# Patient Record
Sex: FEMALE | Race: BLACK OR AFRICAN AMERICAN | NOT HISPANIC OR LATINO | Employment: UNEMPLOYED | URBAN - METROPOLITAN AREA
[De-identification: names, ages, dates, MRNs, and addresses within clinical notes are randomized per-mention and may not be internally consistent; named-entity substitution may affect disease eponyms.]

---

## 2020-01-13 ENCOUNTER — HOSPITAL ENCOUNTER (OUTPATIENT)
Dept: URGENT CARE | Facility: CLINIC | Age: 45
Discharge: HOME | End: 2020-01-13
Attending: FAMILY MEDICINE

## 2022-03-15 ENCOUNTER — APPOINTMENT (OUTPATIENT)
Dept: GENERAL RADIOLOGY | Facility: HOSPITAL | Age: 47
End: 2022-03-15

## 2022-03-15 VITALS
OXYGEN SATURATION: 99 % | DIASTOLIC BLOOD PRESSURE: 81 MMHG | HEART RATE: 84 BPM | RESPIRATION RATE: 16 BRPM | TEMPERATURE: 98.4 F | HEIGHT: 64 IN | WEIGHT: 184.97 LBS | BODY MASS INDEX: 31.58 KG/M2 | SYSTOLIC BLOOD PRESSURE: 111 MMHG

## 2022-03-15 LAB
ALBUMIN SERPL-MCNC: 4.6 G/DL (ref 3.5–5.2)
ALBUMIN/GLOB SERPL: 1.8 G/DL
ALP SERPL-CCNC: 58 U/L (ref 39–117)
ALT SERPL W P-5'-P-CCNC: 16 U/L (ref 1–33)
ANION GAP SERPL CALCULATED.3IONS-SCNC: 9.1 MMOL/L (ref 5–15)
AST SERPL-CCNC: 14 U/L (ref 1–32)
BASOPHILS # BLD AUTO: 0.04 10*3/MM3 (ref 0–0.2)
BASOPHILS NFR BLD AUTO: 0.6 % (ref 0–1.5)
BILIRUB SERPL-MCNC: 0.3 MG/DL (ref 0–1.2)
BUN SERPL-MCNC: 14 MG/DL (ref 6–20)
BUN/CREAT SERPL: 14.3 (ref 7–25)
CALCIUM SPEC-SCNC: 9.2 MG/DL (ref 8.6–10.5)
CHLORIDE SERPL-SCNC: 106 MMOL/L (ref 98–107)
CO2 SERPL-SCNC: 24.9 MMOL/L (ref 22–29)
CREAT SERPL-MCNC: 0.98 MG/DL (ref 0.57–1)
DEPRECATED RDW RBC AUTO: 40.4 FL (ref 37–54)
EGFRCR SERPLBLD CKD-EPI 2021: 73.1 ML/MIN/1.73
EOSINOPHIL # BLD AUTO: 0.1 10*3/MM3 (ref 0–0.4)
EOSINOPHIL NFR BLD AUTO: 1.5 % (ref 0.3–6.2)
ERYTHROCYTE [DISTWIDTH] IN BLOOD BY AUTOMATED COUNT: 12.5 % (ref 12.3–15.4)
GLOBULIN UR ELPH-MCNC: 2.5 GM/DL
GLUCOSE SERPL-MCNC: 97 MG/DL (ref 65–99)
HCT VFR BLD AUTO: 45.4 % (ref 34–46.6)
HGB BLD-MCNC: 15.8 G/DL (ref 12–15.9)
IMM GRANULOCYTES # BLD AUTO: 0.01 10*3/MM3 (ref 0–0.05)
IMM GRANULOCYTES NFR BLD AUTO: 0.1 % (ref 0–0.5)
LYMPHOCYTES # BLD AUTO: 2.83 10*3/MM3 (ref 0.7–3.1)
LYMPHOCYTES NFR BLD AUTO: 42.2 % (ref 19.6–45.3)
MCH RBC QN AUTO: 30.5 PG (ref 26.6–33)
MCHC RBC AUTO-ENTMCNC: 34.8 G/DL (ref 31.5–35.7)
MCV RBC AUTO: 87.6 FL (ref 79–97)
MONOCYTES # BLD AUTO: 0.54 10*3/MM3 (ref 0.1–0.9)
MONOCYTES NFR BLD AUTO: 8 % (ref 5–12)
NEUTROPHILS NFR BLD AUTO: 3.19 10*3/MM3 (ref 1.7–7)
NEUTROPHILS NFR BLD AUTO: 47.6 % (ref 42.7–76)
NRBC BLD AUTO-RTO: 0 /100 WBC (ref 0–0.2)
PLATELET # BLD AUTO: 249 10*3/MM3 (ref 140–450)
PMV BLD AUTO: 9.2 FL (ref 6–12)
POTASSIUM SERPL-SCNC: 4.6 MMOL/L (ref 3.5–5.2)
PROT SERPL-MCNC: 7.1 G/DL (ref 6–8.5)
RBC # BLD AUTO: 5.18 10*6/MM3 (ref 3.77–5.28)
SODIUM SERPL-SCNC: 140 MMOL/L (ref 136–145)
WBC NRBC COR # BLD: 6.71 10*3/MM3 (ref 3.4–10.8)

## 2022-03-15 PROCEDURE — 99283 EMERGENCY DEPT VISIT LOW MDM: CPT

## 2022-03-15 PROCEDURE — 71045 X-RAY EXAM CHEST 1 VIEW: CPT

## 2022-03-15 PROCEDURE — 80053 COMPREHEN METABOLIC PANEL: CPT | Performed by: EMERGENCY MEDICINE

## 2022-03-15 PROCEDURE — 85025 COMPLETE CBC W/AUTO DIFF WBC: CPT

## 2022-03-15 PROCEDURE — 36415 COLL VENOUS BLD VENIPUNCTURE: CPT | Performed by: EMERGENCY MEDICINE

## 2022-03-16 ENCOUNTER — HOSPITAL ENCOUNTER (EMERGENCY)
Facility: HOSPITAL | Age: 47
Discharge: HOME | End: 2022-03-16
Attending: EMERGENCY MEDICINE | Admitting: EMERGENCY MEDICINE

## 2022-03-16 DIAGNOSIS — J40 BRONCHITIS: Primary | ICD-10-CM

## 2022-03-16 LAB
HOLD SPECIMEN: NORMAL
HOLD SPECIMEN: NORMAL
WHOLE BLOOD HOLD SPECIMEN: NORMAL
WHOLE BLOOD HOLD SPECIMEN: NORMAL

## 2022-03-16 PROCEDURE — 63710000001 PREDNISONE PER 5 MG: Performed by: NURSE PRACTITIONER

## 2022-03-16 RX ORDER — ALBUTEROL SULFATE 90 UG/1
2 AEROSOL, METERED RESPIRATORY (INHALATION) EVERY 6 HOURS PRN
Qty: 18 G | Refills: 0 | Status: SHIPPED | OUTPATIENT
Start: 2022-03-16 | End: 2022-07-18

## 2022-03-16 RX ORDER — PREDNISONE 20 MG/1
60 TABLET ORAL DAILY
Qty: 15 TABLET | Refills: 0 | Status: SHIPPED | OUTPATIENT
Start: 2022-03-16 | End: 2022-03-21

## 2022-03-16 RX ORDER — BROMPHENIRAMINE MALEATE, PSEUDOEPHEDRINE HYDROCHLORIDE, AND DEXTROMETHORPHAN HYDROBROMIDE 2; 30; 10 MG/5ML; MG/5ML; MG/5ML
10 SYRUP ORAL 4 TIMES DAILY PRN
Qty: 473 ML | Refills: 0 | OUTPATIENT
Start: 2022-03-16 | End: 2022-07-18

## 2022-03-16 RX ADMIN — PREDNISONE 60 MG: 50 TABLET ORAL at 01:25

## 2022-03-16 NOTE — DISCHARGE INSTRUCTIONS
Take medication as prescribed    Follow-up with PCP    Return for new or worsening symptoms    Tylenol or Motrin as needed for pain

## 2022-03-16 NOTE — ED PROVIDER NOTES
Time: 01:10 EDT  Arrived by: Vehicle  Chief Complaint: Cough  History provided by: Patient  History is limited by: N/A    History of Present Illness:  Patient is a 46 y.o. year old female that presents to the emergency department with cough x 2 weeks      History provided by:  Patient  Cough  Cough characteristics:  Productive  Sputum characteristics:  Clear  Severity:  Moderate  Onset quality:  Gradual  Duration:  2 weeks  Timing:  Constant  Progression:  Waxing and waning  Chronicity:  New  Context: upper respiratory infection    Relieved by:  Nothing  Worsened by:  Deep breathing  Ineffective treatments:  None tried  Associated symptoms: rhinorrhea and sore throat ( from coughing so much)    Associated symptoms: no chest pain, no chills, no diaphoresis, no ear fullness, no ear pain, no eye discharge, no fever, no headaches, no myalgias, no shortness of breath, no sinus congestion, no weight loss and no wheezing        Similar Symptoms Previously: No    Recently seen: No      Patient Care Team  Primary Care Provider: Unknown    Past Medical History:     Allergies   Allergen Reactions   • Latex Swelling   • Sulfate Other (See Comments)     Keeps awake     No past medical history on file.  No past surgical history on file.  No family history on file.    Home Medications:  Prior to Admission medications    Not on File        Social History:   PT  has no history on file for tobacco use, alcohol use, and drug use.    Record Review:  I have reviewed the patient's records in [employer].     Review of Systems  Review of Systems   Constitutional: Negative for activity change, appetite change, chills, diaphoresis, fever and weight loss.   HENT: Positive for rhinorrhea and sore throat ( from coughing so much). Negative for congestion and ear pain.    Eyes: Negative.  Negative for discharge.   Respiratory: Positive for cough. Negative for shortness of breath and wheezing.         Pain when takes a deep breath   Cardiovascular:  "Negative for chest pain.   Gastrointestinal: Negative for abdominal pain, diarrhea, nausea and vomiting.   Endocrine: Negative.    Genitourinary: Negative for flank pain.   Musculoskeletal: Negative for back pain and myalgias.   Skin: Negative.    Allergic/Immunologic: Negative.    Neurological: Negative.  Negative for headaches.   Hematological: Negative.    Psychiatric/Behavioral: Negative.         Physical Exam  /81 (Patient Position: Sitting)   Pulse 84   Temp 98.4 °F (36.9 °C) (Oral)   Resp 16   Ht 161.3 cm (63.5\")   Wt 83.9 kg (184 lb 15.5 oz)   SpO2 99%   BMI 32.25 kg/m²     Physical Exam  Vitals and nursing note reviewed.   Constitutional:       General: She is not in acute distress.     Appearance: Normal appearance. She is not toxic-appearing.   HENT:      Head: Normocephalic and atraumatic.      Right Ear: Tympanic membrane, ear canal and external ear normal.      Left Ear: Tympanic membrane, ear canal and external ear normal.      Nose: Nose normal.      Mouth/Throat:      Mouth: Mucous membranes are moist.      Pharynx: No oropharyngeal exudate or posterior oropharyngeal erythema.   Eyes:      General: No scleral icterus.     Conjunctiva/sclera: Conjunctivae normal.      Pupils: Pupils are equal, round, and reactive to light.   Cardiovascular:      Rate and Rhythm: Normal rate and regular rhythm.      Pulses: Normal pulses.      Heart sounds: Normal heart sounds.   Pulmonary:      Effort: Pulmonary effort is normal. No respiratory distress.      Breath sounds: Normal breath sounds.   Abdominal:      Tenderness: There is no abdominal tenderness.   Musculoskeletal:         General: Normal range of motion.      Cervical back: Normal range of motion and neck supple.   Lymphadenopathy:      Cervical: No cervical adenopathy.   Skin:     General: Skin is warm and dry.   Neurological:      Mental Status: She is alert and oriented to person, place, and time.   Psychiatric:         Mood and Affect: " "Mood normal.         Behavior: Behavior normal.                  ED Course  /81 (Patient Position: Sitting)   Pulse 84   Temp 98.4 °F (36.9 °C) (Oral)   Resp 16   Ht 161.3 cm (63.5\")   Wt 83.9 kg (184 lb 15.5 oz)   SpO2 99%   BMI 32.25 kg/m²   Results for orders placed or performed during the hospital encounter of 03/16/22   Comprehensive Metabolic Panel    Specimen: Blood   Result Value Ref Range    Glucose 97 65 - 99 mg/dL    BUN 14 6 - 20 mg/dL    Creatinine 0.98 0.57 - 1.00 mg/dL    Sodium 140 136 - 145 mmol/L    Potassium 4.6 3.5 - 5.2 mmol/L    Chloride 106 98 - 107 mmol/L    CO2 24.9 22.0 - 29.0 mmol/L    Calcium 9.2 8.6 - 10.5 mg/dL    Total Protein 7.1 6.0 - 8.5 g/dL    Albumin 4.60 3.50 - 5.20 g/dL    ALT (SGPT) 16 1 - 33 U/L    AST (SGOT) 14 1 - 32 U/L    Alkaline Phosphatase 58 39 - 117 U/L    Total Bilirubin 0.3 0.0 - 1.2 mg/dL    Globulin 2.5 gm/dL    A/G Ratio 1.8 g/dL    BUN/Creatinine Ratio 14.3 7.0 - 25.0    Anion Gap 9.1 5.0 - 15.0 mmol/L    eGFR 73.1 >60.0 mL/min/1.73   CBC Auto Differential    Specimen: Blood   Result Value Ref Range    WBC 6.71 3.40 - 10.80 10*3/mm3    RBC 5.18 3.77 - 5.28 10*6/mm3    Hemoglobin 15.8 12.0 - 15.9 g/dL    Hematocrit 45.4 34.0 - 46.6 %    MCV 87.6 79.0 - 97.0 fL    MCH 30.5 26.6 - 33.0 pg    MCHC 34.8 31.5 - 35.7 g/dL    RDW 12.5 12.3 - 15.4 %    RDW-SD 40.4 37.0 - 54.0 fl    MPV 9.2 6.0 - 12.0 fL    Platelets 249 140 - 450 10*3/mm3    Neutrophil % 47.6 42.7 - 76.0 %    Lymphocyte % 42.2 19.6 - 45.3 %    Monocyte % 8.0 5.0 - 12.0 %    Eosinophil % 1.5 0.3 - 6.2 %    Basophil % 0.6 0.0 - 1.5 %    Immature Grans % 0.1 0.0 - 0.5 %    Neutrophils, Absolute 3.19 1.70 - 7.00 10*3/mm3    Lymphocytes, Absolute 2.83 0.70 - 3.10 10*3/mm3    Monocytes, Absolute 0.54 0.10 - 0.90 10*3/mm3    Eosinophils, Absolute 0.10 0.00 - 0.40 10*3/mm3    Basophils, Absolute 0.04 0.00 - 0.20 10*3/mm3    Immature Grans, Absolute 0.01 0.00 - 0.05 10*3/mm3    nRBC 0.0 0.0 - 0.2 " /100 WBC   Green Top (Gel)   Result Value Ref Range    Extra Tube Hold Specimen    Lavender Top   Result Value Ref Range    Extra Tube Hold Specimen    Gold Top - SST   Result Value Ref Range    Extra Tube Hold Specimen    Light Blue Top   Result Value Ref Range    Extra Tube Hold Specimen      Medications   predniSONE (DELTASONE) tablet 60 mg (has no administration in time range)     XR Chest 1 View    Result Date: 3/16/2022  Narrative: PROCEDURE: XR CHEST 1 VW  COMPARISON: None.  INDICATIONS: COUGH X 2 DAYS.  FINDINGS: A single frontal (PA upright portable) chest radiograph reveals no cardiac enlargement and no acute infiltrate. No pneumothorax is seen.      Impression:  No acute cardiopulmonary disease is seen radiographically.      ALAN LIMON JR, MD       Electronically Signed and Approved By: ALAN LIMON JR, MD on 3/16/2022 at 0:49             Medical Decision Making:                     MDM  Number of Diagnoses or Management Options  Bronchitis  Diagnosis management comments: The patient presents to the ED with a cough. The patient is resting comfortably and feels better, is alert and in no distress.  On re-examination the patient does not appear toxic and has no meningeal signs (including a negative Kernig and Brudzinski sign), and there's no intractable vomiting, respiratory distress and no apparent pain. Based on the history, exam, diagnostic testing and reassessment, the patient has no signs of meningitis, significant pneumonia, pyelonephritis, sepsis or other acute serious bacterial infections, or other significant pathology to warrant further testing, continued ED treatment, admission or specialist evaluation. The patient's vital signs have been stable. The patient's condition is stable and is appropriate for discharge. The patient´s symptoms are consistent with a viral upper respiratory infection and antibiotics are not indicated. The patient was counseled to return to the ED for re-evaluation  for worsening cough, shortness of breath, uncontrollable headache, uncontrollable fever, altered mental status, or any symptoms which cause them concern. The patient will pursue further outpatient evaluation with the primary care physician or other designated or consultant physician as indicated in the discharge instructions.         Amount and/or Complexity of Data Reviewed  Clinical lab tests: reviewed and ordered  Tests in the radiology section of CPT®: reviewed and ordered  Tests in the medicine section of CPT®: ordered and reviewed    Risk of Complications, Morbidity, and/or Mortality  Presenting problems: low  Diagnostic procedures: low  Management options: low    Patient Progress  Patient progress: stable       Final diagnoses:   Bronchitis        Disposition:  ED Disposition     ED Disposition   Discharge    Condition   Stable    Comment   --              Daija Soto, APRN  03/16/22 0110

## 2022-03-23 ENCOUNTER — HOSPITAL ENCOUNTER (EMERGENCY)
Facility: HOSPITAL | Age: 47
Discharge: HOME | End: 2022-03-23
Attending: EMERGENCY MEDICINE | Admitting: EMERGENCY MEDICINE

## 2022-03-23 VITALS
HEART RATE: 74 BPM | OXYGEN SATURATION: 99 % | SYSTOLIC BLOOD PRESSURE: 100 MMHG | RESPIRATION RATE: 18 BRPM | DIASTOLIC BLOOD PRESSURE: 70 MMHG | TEMPERATURE: 97.1 F

## 2022-03-23 DIAGNOSIS — M79.18 MUSCULOSKELETAL PAIN: Primary | ICD-10-CM

## 2022-03-23 PROCEDURE — 99283 EMERGENCY DEPT VISIT LOW MDM: CPT

## 2022-03-23 RX ORDER — BACLOFEN 10 MG/1
10 TABLET ORAL ONCE
Status: COMPLETED | OUTPATIENT
Start: 2022-03-23 | End: 2022-03-23

## 2022-03-23 RX ORDER — SODIUM CHLORIDE 0.9 % (FLUSH) 0.9 %
10 SYRINGE (ML) INJECTION AS NEEDED
Status: DISCONTINUED | OUTPATIENT
Start: 2022-03-23 | End: 2022-03-23

## 2022-03-23 RX ORDER — METAXALONE 800 MG/1
800 TABLET ORAL 3 TIMES DAILY PRN
Qty: 20 TABLET | Refills: 0 | OUTPATIENT
Start: 2022-03-23 | End: 2022-07-18

## 2022-03-23 RX ORDER — IBUPROFEN 800 MG/1
800 TABLET ORAL ONCE
Status: COMPLETED | OUTPATIENT
Start: 2022-03-23 | End: 2022-03-23

## 2022-03-23 RX ORDER — NAPROXEN 500 MG/1
500 TABLET ORAL 2 TIMES DAILY WITH MEALS
Qty: 14 TABLET | Refills: 0 | OUTPATIENT
Start: 2022-03-23 | End: 2022-07-18

## 2022-03-23 RX ORDER — ONDANSETRON 2 MG/ML
4 INJECTION INTRAMUSCULAR; INTRAVENOUS ONCE
Status: DISCONTINUED | OUTPATIENT
Start: 2022-03-23 | End: 2022-03-23

## 2022-03-23 RX ADMIN — IBUPROFEN 800 MG: 800 TABLET, FILM COATED ORAL at 18:29

## 2022-03-23 RX ADMIN — BACLOFEN 10 MG: 10 TABLET ORAL at 18:29

## 2022-03-23 NOTE — ED NOTES
"Pt arrives via PV with c/o generalized abd pain \"on and off for a while\". Pt states \"I really just need pain medication\". Pt a&ox4, abc's intact, NAD noted, ambulatory to triage.    Patient wearing mask during triage. RN wearing appropriate PPE during triage. Hand hygiene performed.    "

## 2022-03-23 NOTE — ED PROVIDER NOTES
EMERGENCY DEPARTMENT ENCOUNTER    Room Number:  19/19  Date of encounter:  3/23/2022  PCP: Provider, No Known  Historian: Patient      HPI:  Chief Complaint: Pain      Context: Arlet Montoya is a 46 y.o. female who presents to the ED c/o low back pain and abdominal wall pain after vacuuming and lifting her baby.  The patient has a history of abdominal wall diastases and is scheduled for surgery in [place] next month.  She states she comes emergency room for pain medication.  She denies nausea, vomiting, diarrhea, fevers, chills.      PAST MEDICAL HISTORY  Active Ambulatory Problems     Diagnosis Date Noted   • No Active Ambulatory Problems     Resolved Ambulatory Problems     Diagnosis Date Noted   • No Resolved Ambulatory Problems     No Additional Past Medical History         PAST SURGICAL HISTORY  No past surgical history on file.      FAMILY HISTORY  No family history on file.      SOCIAL HISTORY  Social History     Socioeconomic History   • Marital status:          ALLERGIES  Latex and Sulfate        REVIEW OF SYSTEMS  Review of Systems     The patient denies headache, neck pain, lower extreme pain, lower extreme swelling or focal neuro deficit  All systems reviewed and negative except for those discussed in HPI.     PHYSICAL EXAM    I have reviewed the triage vital signs and nursing notes.    ED Triage Vitals [03/23/22 1611]   Temp Heart Rate Resp BP SpO2   97.1 °F (36.2 °C) 105 18 (!) 130/112 99 %      Temp src Heart Rate Source Patient Position BP Location FiO2 (%)   Tympanic Monitor Standing Left arm --       GENERAL: 46-year-old well developed, well nourished in no acute distress  HENT: NCAT, neck supple, trachea midline  EYES: no scleral icterus, PERRL, normal conjunctivae  CV: regular rhythm, regular rate, no murmur  RESPIRATORY: unlabored effort, CTAB  ABDOMEN: soft, nontender, nondistended, bowel sounds present  MUSCULOSKELETAL: no gross deformity, no pedal edema, no calf  tenderness  NEURO: alert,  sensory and motor function of extremities intact, speech clear, mental status normal  SKIN: warm, dry, no rash  PSYCH:  Appropriate mood and affect      PPE  Pt does not present with symptoms for COVID19; however, I was wearing a mask and goggles throughout all patient interaction.    Vital signs and nursing notes reviewed.      LAB RESULTS  No results found for this or any previous visit (from the past 24 hour(s)).    Ordered the above labs and independently reviewed the results.        RADIOLOGY  No Radiology Exams Resulted Within Past 24 Hours    I ordered the above noted radiological studies. Independently reviewed by me and discussed with radiologist.  See dictation above for official radiology interpretation.      PROCEDURES    Procedures        MEDICATIONS GIVEN IN ER    Medications   ibuprofen (ADVIL,MOTRIN) tablet 800 mg (800 mg Oral Given 3/23/22 1829)   baclofen (LIORESAL) tablet 10 mg (10 mg Oral Given 3/23/22 1829)         PROGRESS, DATA ANALYSIS, CONSULTS, AND MEDICAL DECISION MAKING    All labs have been independently reviewed by me.  All radiology studies have been reviewed by me and discussed with radiologist dictating report.   EKG's independently reviewed by me.  Discussion below represents my analysis of pertinent findings related to patient's condition, differential diagnosis, treatment plan and final disposition.      ED Course as of 03/23/22 1921   Wed Mar 23, 2022   1921 I have recheck the patient's blood work, urinalysis and imaging studies for evaluation of her pain.  The patient states she does not want any this performed and would just like an anti-inflammatory and muscle relaxer.  At this time the patient's vital signs are stable and she appears in no acute distress with a normal physical exam and I believe this to be reasonable. [GP]      ED Course User Index  [GP] Mal Shearer MD               AS OF 19:21 EDT VITALS:    BP - 100/70  HR - 74  TEMP - 97.1  °F (36.2 °C) (Tympanic)  02 SATS - 99%        DIAGNOSIS  Final diagnoses:   Musculoskeletal pain         DISPOSITION  Discharged        EMR Dragon/Transcription disclaimer:   Much of this encounter note is an electronic transcription/translation of spoken language to printed text.        Mal Shearer MD  03/23/22 1922

## 2022-07-18 ENCOUNTER — HOSPITAL ENCOUNTER (EMERGENCY)
Facility: HOSPITAL | Age: 47
Discharge: HOME | End: 2022-07-18
Attending: EMERGENCY MEDICINE | Admitting: EMERGENCY MEDICINE

## 2022-07-18 VITALS
SYSTOLIC BLOOD PRESSURE: 105 MMHG | RESPIRATION RATE: 17 BRPM | HEART RATE: 81 BPM | TEMPERATURE: 99 F | BODY MASS INDEX: 30.75 KG/M2 | OXYGEN SATURATION: 97 % | DIASTOLIC BLOOD PRESSURE: 68 MMHG | HEIGHT: 64 IN | WEIGHT: 180.12 LBS

## 2022-07-18 DIAGNOSIS — N30.01 ACUTE CYSTITIS WITH HEMATURIA: Primary | ICD-10-CM

## 2022-07-18 LAB
ALBUMIN SERPL-MCNC: 4.1 G/DL (ref 3.5–5.2)
ALBUMIN/GLOB SERPL: 1.5 G/DL
ALP SERPL-CCNC: 51 U/L (ref 39–117)
ALT SERPL W P-5'-P-CCNC: 10 U/L (ref 1–33)
ANION GAP SERPL CALCULATED.3IONS-SCNC: 12 MMOL/L (ref 5–15)
AST SERPL-CCNC: 12 U/L (ref 1–32)
BACTERIA UR QL AUTO: ABNORMAL /HPF
BASOPHILS # BLD AUTO: 0.03 10*3/MM3 (ref 0–0.2)
BASOPHILS NFR BLD AUTO: 0.4 % (ref 0–1.5)
BILIRUB SERPL-MCNC: 0.4 MG/DL (ref 0–1.2)
BILIRUB UR QL STRIP: NEGATIVE
BUN SERPL-MCNC: 13 MG/DL (ref 6–20)
BUN/CREAT SERPL: 13 (ref 7–25)
CALCIUM SPEC-SCNC: 9.3 MG/DL (ref 8.6–10.5)
CHLORIDE SERPL-SCNC: 104 MMOL/L (ref 98–107)
CLARITY UR: ABNORMAL
CO2 SERPL-SCNC: 21 MMOL/L (ref 22–29)
COLOR UR: YELLOW
CREAT SERPL-MCNC: 1 MG/DL (ref 0.57–1)
DEPRECATED RDW RBC AUTO: 39.5 FL (ref 37–54)
EGFRCR SERPLBLD CKD-EPI 2021: 71.4 ML/MIN/1.73
EOSINOPHIL # BLD AUTO: 0.05 10*3/MM3 (ref 0–0.4)
EOSINOPHIL NFR BLD AUTO: 0.6 % (ref 0.3–6.2)
ERYTHROCYTE [DISTWIDTH] IN BLOOD BY AUTOMATED COUNT: 12.3 % (ref 12.3–15.4)
GLOBULIN UR ELPH-MCNC: 2.8 GM/DL
GLUCOSE SERPL-MCNC: 118 MG/DL (ref 65–99)
GLUCOSE UR STRIP-MCNC: NEGATIVE MG/DL
HCG INTACT+B SERPL-ACNC: <0.5 MIU/ML
HCT VFR BLD AUTO: 41.8 % (ref 34–46.6)
HGB BLD-MCNC: 14.5 G/DL (ref 12–15.9)
HGB UR QL STRIP.AUTO: ABNORMAL
HOLD SPECIMEN: 11
HOLD SPECIMEN: 11
HYALINE CASTS UR QL AUTO: ABNORMAL /LPF
IMM GRANULOCYTES # BLD AUTO: 0.02 10*3/MM3 (ref 0–0.05)
IMM GRANULOCYTES NFR BLD AUTO: 0.2 % (ref 0–0.5)
KETONES UR QL STRIP: NEGATIVE
LEUKOCYTE ESTERASE UR QL STRIP.AUTO: ABNORMAL
LIPASE SERPL-CCNC: 36 U/L (ref 13–60)
LYMPHOCYTES # BLD AUTO: 1.98 10*3/MM3 (ref 0.7–3.1)
LYMPHOCYTES NFR BLD AUTO: 23.8 % (ref 19.6–45.3)
MCH RBC QN AUTO: 30.2 PG (ref 26.6–33)
MCHC RBC AUTO-ENTMCNC: 34.7 G/DL (ref 31.5–35.7)
MCV RBC AUTO: 87.1 FL (ref 79–97)
MONOCYTES # BLD AUTO: 0.89 10*3/MM3 (ref 0.1–0.9)
MONOCYTES NFR BLD AUTO: 10.7 % (ref 5–12)
NEUTROPHILS NFR BLD AUTO: 5.34 10*3/MM3 (ref 1.7–7)
NEUTROPHILS NFR BLD AUTO: 64.3 % (ref 42.7–76)
NITRITE UR QL STRIP: NEGATIVE
NRBC BLD AUTO-RTO: 0 /100 WBC (ref 0–0.2)
PH UR STRIP.AUTO: 6.5 [PH] (ref 5–8)
PLATELET # BLD AUTO: 215 10*3/MM3 (ref 140–450)
PMV BLD AUTO: 9.5 FL (ref 6–12)
POTASSIUM SERPL-SCNC: 3.8 MMOL/L (ref 3.5–5.2)
PROT SERPL-MCNC: 6.9 G/DL (ref 6–8.5)
PROT UR QL STRIP: ABNORMAL
RBC # BLD AUTO: 4.8 10*6/MM3 (ref 3.77–5.28)
RBC # UR STRIP: ABNORMAL /HPF
REF LAB TEST METHOD: ABNORMAL
SODIUM SERPL-SCNC: 137 MMOL/L (ref 136–145)
SP GR UR STRIP: 1.01 (ref 1–1.03)
SQUAMOUS #/AREA URNS HPF: ABNORMAL /HPF
UROBILINOGEN UR QL STRIP: ABNORMAL
WBC # UR STRIP: ABNORMAL /HPF
WBC CLUMPS # UR AUTO: ABNORMAL /HPF
WBC NRBC COR # BLD: 8.31 10*3/MM3 (ref 3.4–10.8)
WHOLE BLOOD HOLD COAG: 11
WHOLE BLOOD HOLD SPECIMEN: 11

## 2022-07-18 PROCEDURE — 83690 ASSAY OF LIPASE: CPT | Performed by: EMERGENCY MEDICINE

## 2022-07-18 PROCEDURE — 99283 EMERGENCY DEPT VISIT LOW MDM: CPT

## 2022-07-18 PROCEDURE — 80053 COMPREHEN METABOLIC PANEL: CPT | Performed by: EMERGENCY MEDICINE

## 2022-07-18 PROCEDURE — 81001 URINALYSIS AUTO W/SCOPE: CPT | Performed by: EMERGENCY MEDICINE

## 2022-07-18 PROCEDURE — 36415 COLL VENOUS BLD VENIPUNCTURE: CPT

## 2022-07-18 PROCEDURE — 84702 CHORIONIC GONADOTROPIN TEST: CPT | Performed by: EMERGENCY MEDICINE

## 2022-07-18 PROCEDURE — 85025 COMPLETE CBC W/AUTO DIFF WBC: CPT | Performed by: EMERGENCY MEDICINE

## 2022-07-18 RX ORDER — PHENAZOPYRIDINE HYDROCHLORIDE 100 MG/1
200 TABLET, FILM COATED ORAL ONCE
Status: COMPLETED | OUTPATIENT
Start: 2022-07-18 | End: 2022-07-18

## 2022-07-18 RX ORDER — NITROFURANTOIN 25; 75 MG/1; MG/1
100 CAPSULE ORAL 2 TIMES DAILY
Qty: 14 CAPSULE | Refills: 0 | Status: SHIPPED | OUTPATIENT
Start: 2022-07-18 | End: 2022-07-18 | Stop reason: SDUPTHER

## 2022-07-18 RX ORDER — SODIUM CHLORIDE 0.9 % (FLUSH) 0.9 %
10 SYRINGE (ML) INJECTION AS NEEDED
Status: DISCONTINUED | OUTPATIENT
Start: 2022-07-18 | End: 2022-07-18 | Stop reason: HOSPADM

## 2022-07-18 RX ORDER — NITROFURANTOIN 25; 75 MG/1; MG/1
100 CAPSULE ORAL ONCE
Status: COMPLETED | OUTPATIENT
Start: 2022-07-18 | End: 2022-07-18

## 2022-07-18 RX ORDER — PHENAZOPYRIDINE HYDROCHLORIDE 200 MG/1
200 TABLET, FILM COATED ORAL 3 TIMES DAILY
Qty: 6 TABLET | Refills: 0 | Status: SHIPPED | OUTPATIENT
Start: 2022-07-18 | End: 2022-07-20

## 2022-07-18 RX ORDER — PHENAZOPYRIDINE HYDROCHLORIDE 200 MG/1
200 TABLET, FILM COATED ORAL 3 TIMES DAILY
Qty: 6 TABLET | Refills: 0 | Status: SHIPPED | OUTPATIENT
Start: 2022-07-18 | End: 2022-07-18 | Stop reason: SDUPTHER

## 2022-07-18 RX ORDER — FLUCONAZOLE 150 MG/1
150 TABLET ORAL ONCE
Status: COMPLETED | OUTPATIENT
Start: 2022-07-18 | End: 2022-07-18

## 2022-07-18 RX ORDER — NITROFURANTOIN 25; 75 MG/1; MG/1
100 CAPSULE ORAL 2 TIMES DAILY
Qty: 14 CAPSULE | Refills: 0 | Status: SHIPPED | OUTPATIENT
Start: 2022-07-18 | End: 2022-07-25

## 2022-07-18 RX ADMIN — FLUCONAZOLE 150 MG: 150 TABLET ORAL at 04:11

## 2022-07-18 RX ADMIN — PHENAZOPYRIDINE HYDROCHLORIDE 200 MG: 100 TABLET ORAL at 04:11

## 2022-07-18 RX ADMIN — NITROFURANTOIN MONOHYDRATE/MACROCRYSTALLINE 100 MG: 25; 75 CAPSULE ORAL at 04:11

## 2022-07-18 NOTE — DISCHARGE INSTRUCTIONS
Drink plenty fluids.    Take medication as prescribed.    Tylenol Motrin as needed for pain    Follow up with pcp 1 week if needed    Return for new/worse symptoms

## 2022-07-18 NOTE — ED PROVIDER NOTES
Time: 04:38 EDT  Arrived by: Private vehicle  Chief Complaint: Abdominal pain and strong urine  History provided by: Patient  History is limited by: N/A    History of Present Illness:  Patient is a 46 y.o. year old female that presents to the emergency department with abdominal pain and strong urine      Abdominal Pain  Pain location:  Suprapubic  Pain quality: burning and pressure    Pain radiates to:  Does not radiate  Pain severity:  Moderate  Onset quality:  Gradual  Duration:  3 days  Timing:  Constant  Progression:  Unchanged  Chronicity:  New  Context comment:  Patient swam in [facility] on Friday and thinks she ended up with a UTI because she is having urinary symptoms and pressure  Relieved by:  Nothing  Worsened by:  Urination  Ineffective treatments:  NSAIDs (aleve helped)  Associated symptoms: dysuria    Associated symptoms: no anorexia, no belching, no chest pain, no chills, no constipation, no cough, no diarrhea, no fatigue, no fever, no flatus, no hematemesis, no hematochezia, no hematuria, no melena, no nausea, no shortness of breath, no sore throat, no vaginal bleeding, no vaginal discharge and no vomiting    Difficulty Urinating  Associated symptoms: abdominal pain    Associated symptoms: no fever, no flank pain, no nausea, no vaginal discharge and no vomiting            Similar Symptoms Previously: Occasional UTI.  None recent  Recently seen: No      Patient Care Team  Primary Care Provider: None    Past Medical History:     Allergies   Allergen Reactions   • Latex Swelling   • Sulfate Other (See Comments)     Keeps awake     History reviewed. No pertinent past medical history.  History reviewed. No pertinent surgical history.  History reviewed. No pertinent family history.    Home Medications:  Prior to Admission medications    Medication Sig Start Date End Date Taking? Authorizing Provider   albuterol sulfate  (90 Base) MCG/ACT inhaler Inhale 2 puffs Every 6 (Six) Hours As Needed  "(Bronchospasm). 3/16/22   Daija Soto APRN   brompheniramine-pseudoephedrine-DM 30-2-10 MG/5ML syrup Take 10 mL by mouth 4 (Four) Times a Day As Needed for Cough. 3/16/22   Daija Soto APRN   metaxalone (SKELAXIN) 800 MG tablet Take 1 tablet by mouth 3 (Three) Times a Day As Needed for Muscle Spasms. 3/23/22   Mal Shearer MD   naproxen (EC NAPROSYN) 500 MG EC tablet Take 1 tablet by mouth 2 (Two) Times a Day With Meals. 3/23/22   Mal Shearer MD        Social History:   PT  reports that she has never smoked. She does not have any smokeless tobacco history on file. She reports current alcohol use. She reports that she does not use drugs.    Record Review:  I have reviewed the patient's records in [employer].     Review of Systems  Review of Systems   Constitutional: Negative for chills, fatigue and fever.   HENT: Negative for sore throat.    Respiratory: Negative for cough and shortness of breath.    Cardiovascular: Negative for chest pain.   Gastrointestinal: Positive for abdominal pain. Negative for anorexia, constipation, diarrhea, flatus, hematemesis, hematochezia, melena, nausea and vomiting.   Genitourinary: Positive for difficulty urinating, dysuria, frequency and urgency. Negative for flank pain, hematuria, vaginal bleeding and vaginal discharge.   Musculoskeletal: Positive for back pain ( low back achy).   Skin: Negative.    Neurological: Negative.    Hematological: Negative.    Psychiatric/Behavioral: Negative.         Physical Exam  /68   Pulse 81   Temp 99 °F (37.2 °C) (Oral)   Resp 17   Ht 162.6 cm (64\")   Wt 81.7 kg (180 lb 1.9 oz)   SpO2 97%   BMI 30.92 kg/m²     Physical Exam  Vitals and nursing note reviewed.   Constitutional:       General: She is not in acute distress.     Appearance: Normal appearance. She is not toxic-appearing.   HENT:      Head: Normocephalic and atraumatic.      Mouth/Throat:      Mouth: Mucous membranes are moist.   Eyes:      General: No scleral " "icterus.  Cardiovascular:      Rate and Rhythm: Normal rate and regular rhythm.      Pulses: Normal pulses.      Heart sounds: Normal heart sounds.   Pulmonary:      Effort: Pulmonary effort is normal. No respiratory distress.      Breath sounds: Normal breath sounds.   Abdominal:      General: Bowel sounds are normal.      Palpations: Abdomen is soft.      Tenderness: There is no abdominal tenderness. There is no right CVA tenderness or left CVA tenderness.      Hernia: No hernia is present.      Comments: No reproducible abdominal pain with palpation   Genitourinary:     Comments: Patient defers she states she does not have any discharge or bleeding  Musculoskeletal:         General: Normal range of motion.      Cervical back: Normal range of motion and neck supple.   Skin:     General: Skin is warm and dry.   Neurological:      Mental Status: She is alert and oriented to person, place, and time. Mental status is at baseline.   Psychiatric:         Mood and Affect: Mood normal.         Behavior: Behavior normal.          ED Course  /68   Pulse 81   Temp 99 °F (37.2 °C) (Oral)   Resp 17   Ht 162.6 cm (64\")   Wt 81.7 kg (180 lb 1.9 oz)   SpO2 97%   BMI 30.92 kg/m²   Results for orders placed or performed during the hospital encounter of 07/18/22   Comprehensive Metabolic Panel    Specimen: Blood   Result Value Ref Range    Glucose 118 (H) 65 - 99 mg/dL    BUN 13 6 - 20 mg/dL    Creatinine 1.00 0.57 - 1.00 mg/dL    Sodium 137 136 - 145 mmol/L    Potassium 3.8 3.5 - 5.2 mmol/L    Chloride 104 98 - 107 mmol/L    CO2 21.0 (L) 22.0 - 29.0 mmol/L    Calcium 9.3 8.6 - 10.5 mg/dL    Total Protein 6.9 6.0 - 8.5 g/dL    Albumin 4.10 3.50 - 5.20 g/dL    ALT (SGPT) 10 1 - 33 U/L    AST (SGOT) 12 1 - 32 U/L    Alkaline Phosphatase 51 39 - 117 U/L    Total Bilirubin 0.4 0.0 - 1.2 mg/dL    Globulin 2.8 gm/dL    A/G Ratio 1.5 g/dL    BUN/Creatinine Ratio 13.0 7.0 - 25.0    Anion Gap 12.0 5.0 - 15.0 mmol/L    eGFR 71.4 " >60.0 mL/min/1.73   Lipase    Specimen: Blood   Result Value Ref Range    Lipase 36 13 - 60 U/L   Urinalysis With Microscopic If Indicated (No Culture) - Urine, Clean Catch    Specimen: Urine, Clean Catch   Result Value Ref Range    Color, UA Yellow Yellow, Straw    Appearance, UA Turbid (A) Clear    pH, UA 6.5 5.0 - 8.0    Specific Gravity, UA 1.007 1.005 - 1.030    Glucose, UA Negative Negative    Ketones, UA Negative Negative    Bilirubin, UA Negative Negative    Blood, UA Moderate (2+) (A) Negative    Protein,  mg/dL (2+) (A) Negative    Leuk Esterase, UA Large (3+) (A) Negative    Nitrite, UA Negative Negative    Urobilinogen, UA 0.2 E.U./dL 0.2 - 1.0 E.U./dL   hCG, Quantitative, Pregnancy    Specimen: Blood   Result Value Ref Range    HCG Quantitative <0.50 mIU/mL   CBC Auto Differential    Specimen: Blood   Result Value Ref Range    WBC 8.31 3.40 - 10.80 10*3/mm3    RBC 4.80 3.77 - 5.28 10*6/mm3    Hemoglobin 14.5 12.0 - 15.9 g/dL    Hematocrit 41.8 34.0 - 46.6 %    MCV 87.1 79.0 - 97.0 fL    MCH 30.2 26.6 - 33.0 pg    MCHC 34.7 31.5 - 35.7 g/dL    RDW 12.3 12.3 - 15.4 %    RDW-SD 39.5 37.0 - 54.0 fl    MPV 9.5 6.0 - 12.0 fL    Platelets 215 140 - 450 10*3/mm3    Neutrophil % 64.3 42.7 - 76.0 %    Lymphocyte % 23.8 19.6 - 45.3 %    Monocyte % 10.7 5.0 - 12.0 %    Eosinophil % 0.6 0.3 - 6.2 %    Basophil % 0.4 0.0 - 1.5 %    Immature Grans % 0.2 0.0 - 0.5 %    Neutrophils, Absolute 5.34 1.70 - 7.00 10*3/mm3    Lymphocytes, Absolute 1.98 0.70 - 3.10 10*3/mm3    Monocytes, Absolute 0.89 0.10 - 0.90 10*3/mm3    Eosinophils, Absolute 0.05 0.00 - 0.40 10*3/mm3    Basophils, Absolute 0.03 0.00 - 0.20 10*3/mm3    Immature Grans, Absolute 0.02 0.00 - 0.05 10*3/mm3    nRBC 0.0 0.0 - 0.2 /100 WBC   Urinalysis, Microscopic Only - Urine, Clean Catch    Specimen: Urine, Clean Catch   Result Value Ref Range    RBC, UA 3-5 (A) None Seen /HPF    WBC, UA Too Numerous to Count (A) None Seen /HPF    Bacteria, UA 2+ (A)  None Seen /HPF    Squamous Epithelial Cells, UA 3-6 (A) None Seen, 0-2 /HPF    Hyaline Casts, UA None Seen None Seen /LPF    WBC Clumps, UA Small/1+ None Seen /HPF    Methodology Manual Light Microscopy    Green Top (Gel)   Result Value Ref Range    Extra Tube 11    Lavender Top   Result Value Ref Range    Extra Tube 11    Gold Top - SST   Result Value Ref Range    Extra Tube 11    Light Blue Top   Result Value Ref Range    Extra Tube 11      Medications   sodium chloride 0.9 % flush 10 mL (has no administration in time range)   nitrofurantoin (macrocrystal-monohydrate) (MACROBID) capsule 100 mg (100 mg Oral Given 7/18/22 0411)   phenazopyridine (PYRIDIUM) tablet 200 mg (200 mg Oral Given 7/18/22 0411)   fluconazole (DIFLUCAN) tablet 150 mg (150 mg Oral Given 7/18/22 0411)     No results found.    Medical Decision Making:                     MDM  Number of Diagnoses or Management Options  Acute cystitis with hematuria  Diagnosis management comments: The patient is resting comfortably and feels better, is alert and in no distress. Repeat examination is unremarkable and benign; in particular, there's no discomfort at McBurney's point and there is no pulsatile mass. The history, exam, diagnostic testing, and current condition does not suggest acute appendicitis, bowel obstruction, acute cholecystitis, bowel perforation, major gastrointestinal bleeding, severe diverticulitis, abdominal aortic aneurysm, mesenteric ischemia, volvulus, sepsis, or other significant pathology that warrants further testing, continued ED treatment, admission, for surgical evaluation at this point. The vital signs have been stable. The patient does not have uncontrollable pain, intractable vomiting, or other significant symptoms. The patient's condition is stable and appropriate for discharge from the emergency department.         Amount and/or Complexity of Data Reviewed  Clinical lab tests: reviewed and ordered  Tests in the medicine section  of CPT®: reviewed    Risk of Complications, Morbidity, and/or Mortality  Presenting problems: low  Diagnostic procedures: low  Management options: low    Patient Progress  Patient progress: stable       Final diagnoses:   Acute cystitis with hematuria        Disposition:  ED Disposition     ED Disposition   Discharge    Condition   Stable    Comment   --              Daija Soto, APRN  07/18/22 0438

## 2022-11-08 NOTE — PROGRESS NOTES
"CHIEF COMPLAINT  Arlet Montoya presents to [facility] to Establish Care (New pt is her to establish care. ) and OBGYN  (Pt is needing referral to obgyn. She is needing a ultrasound. She has not has any ultrasounds. )     HPI  47 yo pt here to [facility] as a new pt-moved to [facility] in -from the [facility]-Had + preg test 2 weeks ago--c/o nausea, back aches, breast tenderness  LMP-around 2022   is in the  and 4-year-old son came to the examining room towards the end of her visit.    PMH-diastasis recti-by imaging-not bothering pt    Records reviewed, meds reviewed, labs reviewed.  Plan of care is as follows below    SH-one child 7 yo-no cigs or ETOH    Past History:  Allergies: Contrast dye, Latex, Codeine, Quetiapine, and Sulfate   Medical History: has no past medical history on file.   Surgical History: has a past surgical history that includes  section (2019).   Family History: family history is not on file.   Social History: reports that she has never smoked. She does not have any smokeless tobacco history on file. She reports current alcohol use. She reports that she does not use drugs.  Social History     Social History Narrative   • Not on file     Review of Systems -fatigue, no nausea or vomiting, breast tenderness    OBJECTIVE  Vital Signs  Vitals:    22 1351   BP: 108/71   BP Location: Left arm   Patient Position: Sitting   Cuff Size: Adult   Pulse: 106   Resp: 16   Temp: 97.5 °F (36.4 °C)   TempSrc: Temporal   SpO2: 98%   Weight: 80.3 kg (177 lb)   Height: 162.6 cm (64\")      Body mass index is 30.38 kg/m².      Physical Exam  Vitals and nursing note reviewed.   Constitutional:       Appearance: Normal appearance.   HENT:      Head: Normocephalic and atraumatic.   Cardiovascular:      Rate and Rhythm: Normal rate and regular rhythm.   Pulmonary:      Effort: Pulmonary effort is normal.      Breath sounds: Normal breath " sounds.   Abdominal:      General: Bowel sounds are normal. There is distension.      Palpations: Abdomen is soft.      Tenderness: There is no abdominal tenderness. There is guarding.      Comments: pregnant   Musculoskeletal:      Cervical back: Normal range of motion and neck supple.   Neurological:      General: No focal deficit present.      Mental Status: She is alert and oriented to person, place, and time.         RESULTS REVIEW  No results found for: PROBNP, BNP  CMP    CMP 3/15/22 7/18/22   Glucose 97 118 (A)   BUN 14 13   Creatinine 0.98 1.00   Sodium 140 137   Potassium 4.6 3.8   Chloride 106 104   Calcium 9.2 9.3   Albumin 4.60 4.10   Total Bilirubin 0.3 0.4   Alkaline Phosphatase 58 51   AST (SGOT) 14 12   ALT (SGPT) 16 10   (A) Abnormal value            CBC w/diff    CBC w/Diff 3/15/22 7/18/22   WBC 6.71 8.31   RBC 5.18 4.80   Hemoglobin 15.8 14.5   Hematocrit 45.4 41.8   MCV 87.6 87.1   MCH 30.5 30.2   MCHC 34.8 34.7   RDW 12.5 12.3   Platelets 249 215   Neutrophil Rel % 47.6 64.3   Immature Granulocyte Rel % 0.1 0.2   Lymphocyte Rel % 42.2 23.8   Monocyte Rel % 8.0 10.7   Eosinophil Rel % 1.5 0.6   Basophil Rel % 0.6 0.4               No results found for: TSH   No results found for: FREET4         No Images in the past 120 days found..              ASSESSMENT & PLAN  Diagnoses and all orders for this visit:    1. Pregnancy, unspecified gestational age (Primary)  Comments:  Check quantitative hCG.  Continue with prenatal multivitamins refer to OB patient prefers a female doctor can get ultrasound through them then.  Assessment & Plan:  Last menstrual period around September 2022.  Patient with symptoms of pregnancy-nausea breast tenderness and backaches.  Check quantitative A1c CBC BMP.    Orders:  -     Ambulatory Referral to Obstetrics / Gynecology  -     CBC & Differential; Future  -     Hemoglobin A1c; Future  -     Basic Metabolic Panel; Future  -     HCG, B-subunit, Quantitative; Future    2.  Hyperglycemia  Assessment & Plan:  Glucose was 118 in July 2022.  No family history of diabetes we will check A1c.    Orders:  -     Hemoglobin A1c; Future    3. Amenorrhea  Comments:  Most likely due to pregnancy check quantitative hCG.        FOLLOW UP  No follow-ups on file.    Patient was given instructions and counseling regarding her condition or for health maintenance advice. Please see specific information pulled into the AVS if appropriate.

## 2022-11-09 ENCOUNTER — OFFICE VISIT (OUTPATIENT)
Dept: INTERNAL MEDICINE | Facility: CLINIC | Age: 47
End: 2022-11-09

## 2022-11-09 VITALS
TEMPERATURE: 97.5 F | DIASTOLIC BLOOD PRESSURE: 71 MMHG | HEART RATE: 106 BPM | WEIGHT: 177 LBS | RESPIRATION RATE: 16 BRPM | HEIGHT: 64 IN | BODY MASS INDEX: 30.22 KG/M2 | SYSTOLIC BLOOD PRESSURE: 108 MMHG | OXYGEN SATURATION: 98 %

## 2022-11-09 DIAGNOSIS — N91.2 AMENORRHEA: ICD-10-CM

## 2022-11-09 DIAGNOSIS — Z34.90 PREGNANCY, UNSPECIFIED GESTATIONAL AGE: Primary | ICD-10-CM

## 2022-11-09 DIAGNOSIS — R73.9 HYPERGLYCEMIA: ICD-10-CM

## 2022-11-09 LAB
BASOPHILS # BLD AUTO: 0.03 10*3/MM3 (ref 0–0.2)
BASOPHILS NFR BLD AUTO: 0.4 % (ref 0–1.5)
DEPRECATED RDW RBC AUTO: 41.3 FL (ref 37–54)
EOSINOPHIL # BLD AUTO: 0.04 10*3/MM3 (ref 0–0.4)
EOSINOPHIL NFR BLD AUTO: 0.5 % (ref 0.3–6.2)
ERYTHROCYTE [DISTWIDTH] IN BLOOD BY AUTOMATED COUNT: 12.9 % (ref 12.3–15.4)
HBA1C MFR BLD: 5.1 % (ref 4.8–5.6)
HCT VFR BLD AUTO: 44.9 % (ref 34–46.6)
HGB BLD-MCNC: 15.5 G/DL (ref 12–15.9)
IMM GRANULOCYTES # BLD AUTO: 0.03 10*3/MM3 (ref 0–0.05)
IMM GRANULOCYTES NFR BLD AUTO: 0.4 % (ref 0–0.5)
LYMPHOCYTES # BLD AUTO: 2.1 10*3/MM3 (ref 0.7–3.1)
LYMPHOCYTES NFR BLD AUTO: 27 % (ref 19.6–45.3)
MCH RBC QN AUTO: 30.3 PG (ref 26.6–33)
MCHC RBC AUTO-ENTMCNC: 34.5 G/DL (ref 31.5–35.7)
MCV RBC AUTO: 87.7 FL (ref 79–97)
MONOCYTES # BLD AUTO: 0.67 10*3/MM3 (ref 0.1–0.9)
MONOCYTES NFR BLD AUTO: 8.6 % (ref 5–12)
NEUTROPHILS NFR BLD AUTO: 4.92 10*3/MM3 (ref 1.7–7)
NEUTROPHILS NFR BLD AUTO: 63.1 % (ref 42.7–76)
NRBC BLD AUTO-RTO: 0 /100 WBC (ref 0–0.2)
PLATELET # BLD AUTO: 250 10*3/MM3 (ref 140–450)
PMV BLD AUTO: 10.1 FL (ref 6–12)
RBC # BLD AUTO: 5.12 10*6/MM3 (ref 3.77–5.28)
WBC NRBC COR # BLD: 7.79 10*3/MM3 (ref 3.4–10.8)

## 2022-11-09 PROCEDURE — 85025 COMPLETE CBC W/AUTO DIFF WBC: CPT | Performed by: INTERNAL MEDICINE

## 2022-11-09 PROCEDURE — 36415 COLL VENOUS BLD VENIPUNCTURE: CPT | Performed by: INTERNAL MEDICINE

## 2022-11-09 PROCEDURE — 80048 BASIC METABOLIC PNL TOTAL CA: CPT | Performed by: INTERNAL MEDICINE

## 2022-11-09 PROCEDURE — 99203 OFFICE O/P NEW LOW 30 MIN: CPT | Performed by: INTERNAL MEDICINE

## 2022-11-09 PROCEDURE — 84702 CHORIONIC GONADOTROPIN TEST: CPT | Performed by: INTERNAL MEDICINE

## 2022-11-09 PROCEDURE — 83036 HEMOGLOBIN GLYCOSYLATED A1C: CPT | Performed by: INTERNAL MEDICINE

## 2022-11-09 NOTE — ASSESSMENT & PLAN NOTE
Last menstrual period around September 2022.  Patient with symptoms of pregnancy-nausea breast tenderness and backaches.  Check quantitative A1c CBC BMP.

## 2022-11-10 LAB
ANION GAP SERPL CALCULATED.3IONS-SCNC: 12.2 MMOL/L (ref 5–15)
BUN SERPL-MCNC: 8 MG/DL (ref 6–20)
BUN/CREAT SERPL: 11 (ref 7–25)
CALCIUM SPEC-SCNC: 10 MG/DL (ref 8.6–10.5)
CHLORIDE SERPL-SCNC: 99 MMOL/L (ref 98–107)
CO2 SERPL-SCNC: 23.8 MMOL/L (ref 22–29)
CREAT SERPL-MCNC: 0.73 MG/DL (ref 0.57–1)
EGFRCR SERPLBLD CKD-EPI 2021: 104.1 ML/MIN/1.73
GLUCOSE SERPL-MCNC: 81 MG/DL (ref 65–99)
HCG INTACT+B SERPL-ACNC: NORMAL MIU/ML
POTASSIUM SERPL-SCNC: 4.7 MMOL/L (ref 3.5–5.2)
SODIUM SERPL-SCNC: 135 MMOL/L (ref 136–145)

## 2023-01-17 ENCOUNTER — TRANSCRIBE ORDERS (OUTPATIENT)
Dept: ULTRASOUND IMAGING | Facility: HOSPITAL | Age: 48
End: 2023-01-17
Payer: OTHER GOVERNMENT

## 2023-01-17 DIAGNOSIS — O09.529 ANTEPARTUM MULTIGRAVIDA OF ADVANCED MATERNAL AGE: Primary | ICD-10-CM

## 2023-02-10 LAB
EXTERNAL HEPATITIS B SURFACE ANTIGEN: NEGATIVE
EXTERNAL HEPATITIS C, RNA QUANT PCR: NORMAL
EXTERNAL RUBELLA QUALITATIVE: NORMAL
EXTERNAL SYPHILIS RPR SCREEN: NORMAL
HIV1 P24 AG SERPL QL IA: NORMAL

## 2023-02-14 NOTE — PROGRESS NOTES
.  MATERNAL FETAL MEDICINE Consult Note    Dear Dr Tiffany Hays MD:    Thank you for your kind referral of Arlet Montoya.  As you know, she is a 47 y.o.   at 23  1/7 weeks gestation (Estimated Date of Delivery: None noted.). This is a consult.    Her antepartum course is complicated by:  AMA  Absent nasal bone    Aneuploidy Screening: drawn today    HPI: Today, she denies headache, blurry vision, RUQ pain. No vaginal bleeding, no contractions.     Review of History:  History reviewed. No pertinent past medical history.  Past Surgical History:   Procedure Laterality Date   •  SECTION  2019   • UTERINE FIBROID SURGERY         Social History     Socioeconomic History   • Marital status:    Tobacco Use   • Smoking status: Never     Passive exposure: Never   • Smokeless tobacco: Never   Vaping Use   • Vaping Use: Never used   Substance and Sexual Activity   • Alcohol use: Not Currently     Comment: socially   • Drug use: Never   • Sexual activity: Not Currently     Partners: Male     History reviewed. No pertinent family history.   Allergies   Allergen Reactions   • Contrast Dye (Echo Or Unknown Ct/Mr) Swelling     Pt has sore throat , swelling and breathing issues    • Latex Swelling   • Codeine Unknown - High Severity   • Quetiapine Unknown - High Severity   • Sulfate Other (See Comments)     Keeps awake      Current Outpatient Medications on File Prior to Visit   Medication Sig Dispense Refill   • prenatal vitamin (prenatal, CLASSIC, vitamin) tablet Take  by mouth Daily.       No current facility-administered medications on file prior to visit.        Past obstetric, gynecological, medical, surgical, family and social history reviewed.  Relevant lab work and imaging reviewed.    Review of systems  Constitutional:  denies fever, chills, malaise.   ENT/Mouth:  denies sore throat, tinnitis  Eyes: denies vision changes/pain  CV:  denies chest pain  Respiratory:  denies cough/SOB  GI:   "denies N/V, diarrhea, abdominal pain.    :   denies dysuria  Skin:  denies lesions or pruritis   Neuro:  denies weakness, focal neurologic symptoms    Vitals:    02/15/23 1109   BP: 116/70   BP Location: Right arm   Patient Position: Sitting   Pulse: 107   Temp: 98.6 °F (37 °C)   TempSrc: Temporal   Weight: 86.2 kg (190 lb)   Height: 162.6 cm (64\")       PHYSICAL EXAM   GENERAL: Not in acute distress, AAOx3, pleasant  CARDIO: regular rate and rhythm  PULM: symmetric chest rise, speaking in complete sentences without difficulty  NEURO: awake, alert and oriented to person, place, and time  ABDOMINAL: No fundal tenderness, no rebound or guarding, gravid  EXTREMITIES: no bilateral lower extremity edema/tenderness  SKIN: Warm, well-perfused      ULTRASOUND   Please view full ultrasound note on Imaging tab in ViewPoint.      ASSESSMENT/COUNSELIN y.o.   at 23  1/7 weeks gestation (Estimated Date of Delivery: None noted.).    -Pregnancy  [ X ] stable  [   ] improving [  ] worsening    Diagnoses and all orders for this visit:    1. Antepartum multigravida of advanced maternal age (Primary)    2. Abdominal ultrasound, abnormal         Advanced Maternal Age  [ X ] stable  [   ] improving [  ] worsening    The patient's age related risk for aneuploidy was reviewed. Noninvasive prenatal screening with cell-free fetal DNA (NIPT) has not been done--we discussed this today.    Advanced maternal age has been associated with placental insufficiency with increased risk of fetal growth disorder and hypertensive disorders. Recommend fetal growth surveillance. Start  fetal surveillance with weekly BPP at 32 weeks.      For a woman who will be 47 year old at the time of delivery. I discussed this today  The risk of trisomy 21 (Down syndrome) is 1:22   The risk of trisomy 18 (Edward syndrome) is 1:84   The risk for any chromsomal abnormaility is 1:11    Recommend baby ASA, which she is taking.      Short nasal " bone:  Absent nasal bone may occur in 0.5 - 2.6% of normal fetuses with ethnic variability noted. Higher rates of absent NB noted in Chinese,  and  normal fetuses. Absent NB is associated with increased risk for chromosomal aneuploidy including trisomies (21, 18, 13) and hare syndrome. 60% of fetuses with T21 have absent NB.  This is very possibly a normal variant but she has not had cell free DNA screening, so we discussed this.  She at first thought I was talking about an amniocentesis and adamently refused, which is reasonable, but once I explained to her that it was a screening test with her blood, she was amenable to this.  We discussed that a positive result would mean further discussion about an amniocentesis but of course she never has to do any test she does not want to do.  She understands.      Thankfully, the rest of her anatomy was normal.      Summary of Plan  -Serial growth ultrasounds every 4 wks(by primary OB)   -Starting at 32 weeks: Weekly fetal  surveillance until delivery by primary OB  -Delivery at 39 weeks unless indicated sooner.  -Panorama sent today for cell free DNA testing.      Follow-up: No follow up with MFM scheduled, but I am happy to see for follow up at request of primary obstetrician    Thank you for the consult and opportunity to care for this patient.  Please feel free to reach out with any questions or concerns.      I spent 20 minutes caring for this patient on this date of service. This time includes time spent by me in the following activities: preparing for the visit, reviewing tests, obtaining and/or reviewing a separately obtained history, performing a medically appropriate examination and/or evaluation, counseling and educating the patient/family/caregiver and independently interpreting results and communicating that information with the patient/family/caregiver with greater than 50% spent in counseling and coordination of care.       I spent 5  minutes on the separately reported service of US imaging not included in the time used to support the E/M service also reported today.      Aleyda Resendez MD [facility] [place]etal Medicine-[facility]  Office: [phone]  [email or web address]

## 2023-02-15 ENCOUNTER — OFFICE VISIT (OUTPATIENT)
Dept: OBSTETRICS AND GYNECOLOGY | Facility: CLINIC | Age: 48
End: 2023-02-15
Payer: OTHER GOVERNMENT

## 2023-02-15 ENCOUNTER — HOSPITAL ENCOUNTER (OUTPATIENT)
Dept: ULTRASOUND IMAGING | Facility: HOSPITAL | Age: 48
Discharge: HOME | End: 2023-02-15
Payer: OTHER GOVERNMENT

## 2023-02-15 ENCOUNTER — LAB (OUTPATIENT)
Dept: LAB | Facility: HOSPITAL | Age: 48
End: 2023-02-15
Payer: OTHER GOVERNMENT

## 2023-02-15 VITALS
HEART RATE: 107 BPM | WEIGHT: 190 LBS | HEIGHT: 64 IN | SYSTOLIC BLOOD PRESSURE: 116 MMHG | BODY MASS INDEX: 32.44 KG/M2 | TEMPERATURE: 98.6 F | DIASTOLIC BLOOD PRESSURE: 70 MMHG

## 2023-02-15 DIAGNOSIS — O09.529 ANTEPARTUM MULTIGRAVIDA OF ADVANCED MATERNAL AGE: Primary | ICD-10-CM

## 2023-02-15 DIAGNOSIS — O09.529 ANTEPARTUM MULTIGRAVIDA OF ADVANCED MATERNAL AGE: ICD-10-CM

## 2023-02-15 DIAGNOSIS — R93.5 ABDOMINAL ULTRASOUND, ABNORMAL: ICD-10-CM

## 2023-02-15 PROCEDURE — 76811 OB US DETAILED SNGL FETUS: CPT | Performed by: OBSTETRICS & GYNECOLOGY

## 2023-02-15 PROCEDURE — 99203 OFFICE O/P NEW LOW 30 MIN: CPT | Performed by: OBSTETRICS & GYNECOLOGY

## 2023-02-15 PROCEDURE — 76811 OB US DETAILED SNGL FETUS: CPT

## 2023-02-15 RX ORDER — PRENATAL VIT NO.126/IRON/FOLIC 28MG-0.8MG
TABLET ORAL DAILY
COMMUNITY

## 2023-02-15 NOTE — PROGRESS NOTES
Pt reports that she is doing well and denies vaginal bleeding, cramping, contractions or LOF at this time. Reports active fetal movement. Reviewed when to call OB office or present to L&D for evaluation with symptoms such as decreased fetal movement, vaginal bleeding, LOF or ctxs. Pt verbalized understanding. Denies HA, visual changes or epigastric pain. Denies any additional complaints at time of appointment. Next OB appointment scheduled in March.     Vitals:    02/15/23 1109   BP: 116/70   Pulse: 107   Temp: 98.6 °F (37 °C)

## 2023-05-04 ENCOUNTER — TELEPHONE (OUTPATIENT)
Dept: OBSTETRICS AND GYNECOLOGY | Facility: CLINIC | Age: 48
End: 2023-05-04
Payer: OTHER GOVERNMENT

## 2023-05-04 NOTE — TELEPHONE ENCOUNTER
Pt is scheduled for next Thursday, 5/11/23 at 1:00pm.  She is EXTREMELY grateful that you accepted her care.          Pt # [phone]

## 2023-05-04 NOTE — TELEPHONE ENCOUNTER
Pt SW [facility]-requesting to establish prenatal care with our office.  Approx 34+ wks, AMA, seen one time at PCP on 11/9/22, saw Dr. Resendez one time on 2/15/23, no regular prenatal care.    Please see [facility] msg below, review records in Care Everywhere and advise if you are able to accept pt's care.    Thanks,   Mary Alice      [facility] msg:  TRANSFER OF OB CARE - PT IS DUE 6-15-23 - STATES SHE WENT TO [facility] FIRST AT BEGINNING OF PREGNANCY BUT WAS DISMISSED DUE TO NO SHOWS (STATES SHE WAS GOING THROUGH SOME DOMESTIC ISSUES **SHE ASKED ME TO REMOVE HER SPOUSE JEFFERY FROM HER EMERGENCY CONTACTS) - HAS  SELECT INSURANCE WHICH DOESN'T NEED APPROVAL - STATES SHE WILL HAVE RECORDS FAXED OVER ASAP.

## 2023-05-11 ENCOUNTER — INITIAL PRENATAL (OUTPATIENT)
Dept: OBSTETRICS AND GYNECOLOGY | Facility: CLINIC | Age: 48
End: 2023-05-11
Payer: OTHER GOVERNMENT

## 2023-05-11 ENCOUNTER — TELEPHONE (OUTPATIENT)
Dept: OBSTETRICS AND GYNECOLOGY | Facility: CLINIC | Age: 48
End: 2023-05-11

## 2023-05-11 VITALS — DIASTOLIC BLOOD PRESSURE: 72 MMHG | SYSTOLIC BLOOD PRESSURE: 103 MMHG | WEIGHT: 203 LBS | BODY MASS INDEX: 34.84 KG/M2

## 2023-05-11 DIAGNOSIS — Z34.83 MULTIGRAVIDA IN THIRD TRIMESTER: Primary | ICD-10-CM

## 2023-05-11 DIAGNOSIS — Z98.890 HISTORY OF MYOMECTOMY: ICD-10-CM

## 2023-05-11 DIAGNOSIS — Z98.891 PREVIOUS CESAREAN SECTION: ICD-10-CM

## 2023-05-11 LAB
GLUCOSE UR STRIP-MCNC: NEGATIVE MG/DL
PROT UR STRIP-MCNC: NEGATIVE MG/DL

## 2023-05-11 NOTE — TELEPHONE ENCOUNTER
LAW    Can you call and get her prenatal labs from [facility]?  I do not see them in the chart.    Thanks    Jaleel

## 2023-05-11 NOTE — PROGRESS NOTES
"Cc:  Transfer of care during pregnancy  No complaints.  Reports good FM.  Patient was seen by [facility].  She and primary OB in that practice had some communication issues and patient decided to change practices.  Notably, she had a twin demise 7 or 8 years ago at midgestation.  Subsequently, a robotic myomectomy was performed and operative reports is not available.  Patient moved to [place] and she became pregnant; she was delivered via primary .    Past medical, surgical, obstetric, genetic and social histories reviewed.  Allergies and medications reviewed.  GBS done.  Ultrasound with AC at 80th percentile and overall fetal weight at 52nd percentile.  Normal ALLAN.  A/P:  IUP at 35 weeks with previous unknown type of myomectomy and previous   - Had long discussion regarding risks of myomectomy and trial of labor.  Since the operative note is unavailable at this time, I cannot give patient complete counseling until it is reviewed by me.  Discussed differences in myomectomy and risks trial of labor if myomectomy involved \"breach\" of endometrial cavity.  If the operative note suggests this, I would not advise on trial of labor given risks of uterine rupture.  Otherwise, a trial of labor is reasonable.  Also of note, patient will not allow a pelvic exam which complicates guidance on trial of labor and/or cervical ripening agents if indicated.  Patient performed her own GBS swab today.  Sonogram with normal growth but abdominal circumference at 80th percentile.  - Follow up in one week.  "

## 2023-05-12 ENCOUNTER — TELEPHONE (OUTPATIENT)
Dept: OBSTETRICS AND GYNECOLOGY | Facility: CLINIC | Age: 48
End: 2023-05-12
Payer: OTHER GOVERNMENT

## 2023-05-12 NOTE — TELEPHONE ENCOUNTER
HUB call. [id number]. OB 5/19/23. I see that we received records from [facility], but not from [place]. Thank you.

## 2023-05-12 NOTE — TELEPHONE ENCOUNTER
Caller: Arlet Montoya    Relationship: Self    Best call back number: [phone]    What is the best time to reach you: ANY AND MAY LEAVE V/M    What was the call regarding: PT CALLING TO SPEAK WITH DR. VIDAL NURSE INQUIRING IF RECORDS FROM [place] WERE RCVD. STATES SHE SIGNED RELEASE YESTERDAY. VERIFIED OUR [phone] WITH PT. UNABLE TO WT.     Do you require a callback: YES

## 2023-05-13 LAB
AMPHETAMINES UR QL SCN: NEGATIVE NG/ML
BARBITURATES UR QL SCN: NEGATIVE NG/ML
BENZODIAZ UR QL: NEGATIVE NG/ML
BZE UR QL: NEGATIVE NG/ML
CANNABINOIDS UR QL SCN: NEGATIVE NG/ML
METHADONE UR QL SCN: NEGATIVE NG/ML
OPIATES UR QL: NEGATIVE NG/ML
PCP UR QL: NEGATIVE NG/ML
PROPOXYPH UR QL SCN: NEGATIVE NG/ML

## 2023-05-14 LAB — GP B STREP DNA SPEC QL NAA+PROBE: NEGATIVE

## 2023-05-15 NOTE — TELEPHONE ENCOUNTER
I tried calling the patient there was no answer nor was a vm available. I have not received any Records from [place], I requested these 5 days ago. 5-15-23/lw

## 2023-05-16 ENCOUNTER — TELEPHONE (OUTPATIENT)
Dept: OBSTETRICS AND GYNECOLOGY | Facility: CLINIC | Age: 48
End: 2023-05-16
Payer: OTHER GOVERNMENT

## 2023-05-24 ENCOUNTER — ROUTINE PRENATAL (OUTPATIENT)
Dept: OBSTETRICS AND GYNECOLOGY | Facility: CLINIC | Age: 48
End: 2023-05-24
Payer: OTHER GOVERNMENT

## 2023-05-24 VITALS — SYSTOLIC BLOOD PRESSURE: 112 MMHG | DIASTOLIC BLOOD PRESSURE: 68 MMHG | WEIGHT: 207 LBS | BODY MASS INDEX: 35.53 KG/M2

## 2023-05-24 DIAGNOSIS — O09.523 MULTIGRAVIDA OF ADVANCED MATERNAL AGE IN THIRD TRIMESTER: ICD-10-CM

## 2023-05-24 DIAGNOSIS — Z98.890 HISTORY OF MYOMECTOMY: ICD-10-CM

## 2023-05-24 DIAGNOSIS — Z98.891 PREVIOUS CESAREAN SECTION: ICD-10-CM

## 2023-05-24 DIAGNOSIS — Z34.83 MULTIGRAVIDA IN THIRD TRIMESTER: Primary | ICD-10-CM

## 2023-05-24 LAB
GLUCOSE UR STRIP-MCNC: NEGATIVE MG/DL
PROT UR STRIP-MCNC: NEGATIVE MG/DL

## 2023-05-24 PROCEDURE — 0502F SUBSEQUENT PRENATAL CARE: CPT | Performed by: OBSTETRICS & GYNECOLOGY

## 2023-05-24 NOTE — PROGRESS NOTES
Cc:  Pregnancy follow up.  No complaints.  No significant pain.  Patient fell 2 to 3 days ago but reports good FM and no bleeding.    Vitals reviewed by me.  Gen - alert and pleasant.  Abdomen - gravid, nontender, no guarding or rebound.  GBS Negative.  A/P:  IUP at 37 weeks with AMA, previous C/S  - AMA.  Needs weekly BPP until delivery.  - Previous C/S, history of myomectomy.  Operative note reviewed and there is no evidence of compromise of uterine cavity.  She is a reasonable candidate for TOLAC but she has thus far not allowed an exam of the cervix.  Discussed risks of uterine rupture with induction versus natural labor.  Discussed possibility of repeat C/S if labor does not ensue versus induction.  Patient likely to allow exam next visit.  Questions answered.

## 2023-05-30 ENCOUNTER — ROUTINE PRENATAL (OUTPATIENT)
Dept: OBSTETRICS AND GYNECOLOGY | Facility: CLINIC | Age: 48
End: 2023-05-30

## 2023-05-30 VITALS — WEIGHT: 209 LBS | SYSTOLIC BLOOD PRESSURE: 112 MMHG | BODY MASS INDEX: 35.87 KG/M2 | DIASTOLIC BLOOD PRESSURE: 70 MMHG

## 2023-05-30 DIAGNOSIS — Z3A.38 38 WEEKS GESTATION OF PREGNANCY: Primary | ICD-10-CM

## 2023-05-30 DIAGNOSIS — Z98.891 PREVIOUS CESAREAN SECTION: ICD-10-CM

## 2023-05-30 LAB
EXPIRATION DATE: NORMAL
GLUCOSE UR STRIP-MCNC: NEGATIVE MG/DL
Lab: NORMAL
PROT UR STRIP-MCNC: NEGATIVE MG/DL

## 2023-05-30 NOTE — PROGRESS NOTES
"Cc:  Pregnancy follow up.  No complaints.   Good FM.  No pain or significant cramping/contractions.  No bleeding.  Patient declines an exam.  BP normal.  Gen - alert and pleasant.  Abdomen - gravid, nontender.  A/P:  IUP at 38 weeks with previous , AMA  - Patient declines exam.  There is some pitfalls to refusing exam in patient who is approaching term with possible .   has increased risks.  In addition, if patient presents to hospital, there can be some difficulties in assessing onset of true labor.  Further, if exam is declined and patient becomes postterm, the path for delivery is somewhat unclear.  Induction increases risks of uterine rupture.  Interventions including membrane sweep can help reduce the need for induction.  In addition, the further postterm the patient becomes, the higher the risk of fetal demise, even with normal ultrasound/fetal testing.  She has risk factors in pregnancy including age and LGA.  Patient repeated declared that she is simply \"putting her vilma in Good\" for the remainder of this pregnancy.  I did  that it is important to understand the risks of pregnancy and that she has a responsibility to make choices.  If she becomes postterm and does not allow exam, it is difficult to  patient further on risks of induction and resultant risks of poor outcome in pregnancy.  If she does not allow induction or repeat , the further postterm patient becomes, the higher the risks of fetal demise due to placental factors.  I suspect she will not allow an exam in pregnancy.  My medical assistant was present for some of this discussion.      "

## 2023-06-13 ENCOUNTER — DOCUMENTATION (OUTPATIENT)
Dept: LABOR AND DELIVERY | Facility: HOSPITAL | Age: 48
End: 2023-06-13
Payer: OTHER GOVERNMENT

## 2023-06-13 NOTE — PROGRESS NOTES
"Spoke with pt today on phone with her mother on a 3 way call.  Pt wants us to be familiar with her in case she can't make it to [place] for delivery.  Pts primary OB is Ousmane Marmolejo.  She states that he was ok with her doing a TOLAC.  Pt has had a prior myomectomy and states she was fine to have a vaginal delivery.  I told her of the risks of a  and if prior surgery entered the uterus the risks are much higher.  I told her the safest for the baby was a RCS.  Pt states \"its in Gods hands\".  She has an appointment with Dr. Marmolejo tomorrow and I stressed for her to keep it.  Upon securely chatting with Dr. Marmolejo, the op note never specifically said that the cavity was not entered.  If pt shows up R/B/A of TOLAC need to be reviewed again including uterine rupture, hemorrhage, hysterectomy, fetal death.    "

## 2023-06-14 ENCOUNTER — ROUTINE PRENATAL (OUTPATIENT)
Dept: OBSTETRICS AND GYNECOLOGY | Facility: CLINIC | Age: 48
End: 2023-06-14
Payer: OTHER GOVERNMENT

## 2023-06-14 VITALS — BODY MASS INDEX: 36.22 KG/M2 | WEIGHT: 211 LBS | DIASTOLIC BLOOD PRESSURE: 80 MMHG | SYSTOLIC BLOOD PRESSURE: 128 MMHG

## 2023-06-14 DIAGNOSIS — O09.523 MULTIGRAVIDA OF ADVANCED MATERNAL AGE IN THIRD TRIMESTER: ICD-10-CM

## 2023-06-14 DIAGNOSIS — Z3A.40 40 WEEKS GESTATION OF PREGNANCY: Primary | ICD-10-CM

## 2023-06-14 DIAGNOSIS — O48.0 POST TERM PREGNANCY OVER 40 WEEKS: ICD-10-CM

## 2023-06-14 LAB
EXPIRATION DATE: ABNORMAL
GLUCOSE UR STRIP-MCNC: NEGATIVE MG/DL
Lab: ABNORMAL
PROT UR STRIP-MCNC: ABNORMAL MG/DL

## 2023-06-14 NOTE — PROGRESS NOTES
"Cc:  Follow up.  Patient declines a vaginal exam. She denies any contractions or leaking of fluid.  Fetal movement is excellent.  BP and urine without signs of pre-eclampsia.  Ultrasound today with BPP 8/8, ALLAN 18, cephalic presentation and normal growth.  A/P:  IUP at 40 weeks with AMA, previous   - Patient declines induction of labor or repeat .  Fetal testing is reassuring; however, there are risks in pregnancies in women with advanced maternal age who are postterm.  Additionally, she truly has \"advanced\" maternal age with pregnancy over 45 years of age.  Discussed that expert recommendation for pregnancy management include delivery before 40 weeks; otherwise, fetal testing needs to occur.  Waiting for delivery after 41 weeks is strongly discouraged because of placental risks.  She does not wanting any induction or interventions.  I recommended having twice weekly testing -- she plans to go to [facility] this week for NST and then will have another BPP next week.  I made it clear that this was not my preference for management, that delivery should be undertaken at this stage.  That said, we explored scenarios for labor/management:  1)  Patient will allow exams in labor.  She understands that this is the only way she will know if she is progressing.  Exams also allow team to prepare for delivery and care of infant.  2)  Fetal distress.  Discussed that distress specifically could be worrisome for 2 complications which include fetal brain injury and/or suggestion of maternal uterine rupture in  patient.  She reports that she understands that 30 of time can be given to attempt to correct distress and that continuing on without correction can lead to fetal issues.  She is agreeable to  in this scenario.  3)  Failure to progress.  Discussed delivery and risks for pregnancy if labor stops or there is a second stage arrest.  A reasonable attempt will be made to allow vaginal delivery but arrest " "disorders increase risks to baby and patient, including distress and uterine rupture.  She is agreeable to  at this point.  Patient voiced \"appreciation\" for taking care of her and respecting her wishes.  If a suboptimal outcome occurs, she voiced that this is \"God's will.\"  "

## 2023-06-16 ENCOUNTER — HOSPITAL ENCOUNTER (INPATIENT)
Facility: HOSPITAL | Age: 48
LOS: 2 days | Discharge: HOME | End: 2023-06-18
Attending: OBSTETRICS & GYNECOLOGY | Admitting: OBSTETRICS & GYNECOLOGY
Payer: OTHER GOVERNMENT

## 2023-06-16 ENCOUNTER — ANESTHESIA (OUTPATIENT)
Dept: LABOR AND DELIVERY | Facility: HOSPITAL | Age: 48
End: 2023-06-16
Payer: OTHER GOVERNMENT

## 2023-06-16 ENCOUNTER — ANESTHESIA EVENT (OUTPATIENT)
Dept: LABOR AND DELIVERY | Facility: HOSPITAL | Age: 48
End: 2023-06-16
Payer: OTHER GOVERNMENT

## 2023-06-16 PROBLEM — Z98.891 HISTORY OF UTERINE SCAR DUE TO PREVIOUS SURGERY: Status: ACTIVE | Noted: 2023-06-16

## 2023-06-16 PROBLEM — Z37.9 NORMAL LABOR: Status: ACTIVE | Noted: 2023-06-16

## 2023-06-16 LAB
ABO GROUP BLD: NORMAL
ABO GROUP BLD: NORMAL
BASE EXCESS BLDCOA CALC-SCNC: -2.4 MMOL/L (ref -2–2)
BASE EXCESS BLDCOV CALC-SCNC: -2.8 MMOL/L (ref -2–2)
BLD GP AB SCN SERPL QL: NEGATIVE
DEPRECATED RDW RBC AUTO: 43.8 FL (ref 37–54)
ERYTHROCYTE [DISTWIDTH] IN BLOOD BY AUTOMATED COUNT: 15.1 % (ref 12.3–15.4)
HCO3 BLDCOA-SCNC: 25.9 MMOL/L
HCO3 BLDCOV-SCNC: 23.7 MMOL/L
HCT VFR BLD AUTO: 34.5 % (ref 34–46.6)
HGB BLD-MCNC: 11.1 G/DL (ref 12–15.9)
MCH RBC QN AUTO: 25.6 PG (ref 26.6–33)
MCHC RBC AUTO-ENTMCNC: 32.2 G/DL (ref 31.5–35.7)
MCV RBC AUTO: 79.7 FL (ref 79–97)
PCO2 BLDCOA: 59.3 MMHG (ref 33–49)
PCO2 BLDCOV: 47.1 MM HG (ref 28–40)
PH BLDCOA: 7.26 PH UNITS (ref 7.21–7.31)
PH BLDCOV: 7.32 PH UNITS (ref 7.31–7.37)
PLATELET # BLD AUTO: 192 10*3/MM3 (ref 140–450)
PMV BLD AUTO: 9 FL (ref 6–12)
PO2 BLDCOA: <40.5 MMHG
PO2 BLDCOV: <40.5 MM HG (ref 21–31)
RBC # BLD AUTO: 4.33 10*6/MM3 (ref 3.77–5.28)
RH BLD: POSITIVE
RH BLD: POSITIVE
T&S EXPIRATION DATE: NORMAL
WBC NRBC COR # BLD: 12.08 10*3/MM3 (ref 3.4–10.8)

## 2023-06-16 PROCEDURE — 25010000002 PROPOFOL 10 MG/ML EMULSION: Performed by: ANESTHESIOLOGY

## 2023-06-16 PROCEDURE — 25010000002 FENTANYL CITRATE (PF) 50 MCG/ML SOLUTION: Performed by: ANESTHESIOLOGY

## 2023-06-16 PROCEDURE — 25010000002 METOCLOPRAMIDE PER 10 MG

## 2023-06-16 PROCEDURE — 25010000002 CEFAZOLIN PER 500 MG: Performed by: ANESTHESIOLOGY

## 2023-06-16 PROCEDURE — 94799 UNLISTED PULMONARY SVC/PX: CPT

## 2023-06-16 PROCEDURE — 86901 BLOOD TYPING SEROLOGIC RH(D): CPT | Performed by: OBSTETRICS & GYNECOLOGY

## 2023-06-16 PROCEDURE — 25010000002 OXYTOCIN PER 10 UNITS: Performed by: ANESTHESIOLOGY

## 2023-06-16 PROCEDURE — 25010000002 AZITHROMYCIN PER 500 MG

## 2023-06-16 PROCEDURE — 25010000002 HYDROMORPHONE PER 4 MG: Performed by: ANESTHESIOLOGY

## 2023-06-16 PROCEDURE — 0 HYDROMORPHONE HCL PF 50 MG/5ML SOLUTION: Performed by: OBSTETRICS & GYNECOLOGY

## 2023-06-16 PROCEDURE — 0 MEPERIDINE PER 100 MG: Performed by: ANESTHESIOLOGY

## 2023-06-16 PROCEDURE — 86900 BLOOD TYPING SEROLOGIC ABO: CPT

## 2023-06-16 PROCEDURE — 25010000002 ONDANSETRON PER 1 MG: Performed by: ANESTHESIOLOGY

## 2023-06-16 PROCEDURE — 25010000002 DEXAMETHASONE PER 1 MG: Performed by: ANESTHESIOLOGY

## 2023-06-16 PROCEDURE — 86850 RBC ANTIBODY SCREEN: CPT | Performed by: OBSTETRICS & GYNECOLOGY

## 2023-06-16 PROCEDURE — 86901 BLOOD TYPING SEROLOGIC RH(D): CPT

## 2023-06-16 PROCEDURE — 85027 COMPLETE CBC AUTOMATED: CPT | Performed by: OBSTETRICS & GYNECOLOGY

## 2023-06-16 PROCEDURE — 25010000002 NEOSTIGMINE 10 MG/10ML SOLUTION: Performed by: ANESTHESIOLOGY

## 2023-06-16 PROCEDURE — 82803 BLOOD GASES ANY COMBINATION: CPT | Performed by: OBSTETRICS & GYNECOLOGY

## 2023-06-16 PROCEDURE — 94761 N-INVAS EAR/PLS OXIMETRY MLT: CPT

## 2023-06-16 PROCEDURE — 25010000002 KETOROLAC TROMETHAMINE PER 15 MG: Performed by: ANESTHESIOLOGY

## 2023-06-16 PROCEDURE — 86900 BLOOD TYPING SEROLOGIC ABO: CPT | Performed by: OBSTETRICS & GYNECOLOGY

## 2023-06-16 RX ORDER — CALCIUM CARBONATE 500 MG/1
1 TABLET, CHEWABLE ORAL EVERY 4 HOURS PRN
Status: DISCONTINUED | OUTPATIENT
Start: 2023-06-16 | End: 2023-06-18

## 2023-06-16 RX ORDER — GLYCOPYRROLATE 0.2 MG/ML
INJECTION INTRAMUSCULAR; INTRAVENOUS AS NEEDED
Status: DISCONTINUED | OUTPATIENT
Start: 2023-06-16 | End: 2023-06-16 | Stop reason: SURG

## 2023-06-16 RX ORDER — SUCCINYLCHOLINE/SOD CL,ISO/PF 100 MG/5ML
SYRINGE (ML) INTRAVENOUS AS NEEDED
Status: DISCONTINUED | OUTPATIENT
Start: 2023-06-16 | End: 2023-06-16 | Stop reason: SURG

## 2023-06-16 RX ORDER — HYDROMORPHONE HCL 110MG/55ML
0.25 PATIENT CONTROLLED ANALGESIA SYRINGE INTRAVENOUS
Status: DISCONTINUED | OUTPATIENT
Start: 2023-06-16 | End: 2023-06-17

## 2023-06-16 RX ORDER — NEOSTIGMINE METHYLSULFATE 1 MG/ML
INJECTION, SOLUTION INTRAVENOUS AS NEEDED
Status: DISCONTINUED | OUTPATIENT
Start: 2023-06-16 | End: 2023-06-16 | Stop reason: SURG

## 2023-06-16 RX ORDER — FAMOTIDINE 10 MG/ML
INJECTION, SOLUTION INTRAVENOUS
Status: COMPLETED
Start: 2023-06-16 | End: 2023-06-16

## 2023-06-16 RX ORDER — PRENATAL VIT/IRON FUM/FOLIC AC 27MG-0.8MG
1 TABLET ORAL DAILY
Status: DISCONTINUED | OUTPATIENT
Start: 2023-06-17 | End: 2023-06-18

## 2023-06-16 RX ORDER — FENTANYL CITRATE 50 UG/ML
INJECTION, SOLUTION INTRAMUSCULAR; INTRAVENOUS AS NEEDED
Status: DISCONTINUED | OUTPATIENT
Start: 2023-06-16 | End: 2023-06-16 | Stop reason: SURG

## 2023-06-16 RX ORDER — MEPERIDINE HYDROCHLORIDE 25 MG/ML
INJECTION INTRAMUSCULAR; INTRAVENOUS; SUBCUTANEOUS AS NEEDED
Status: DISCONTINUED | OUTPATIENT
Start: 2023-06-16 | End: 2023-06-16 | Stop reason: SURG

## 2023-06-16 RX ORDER — KETOROLAC TROMETHAMINE 30 MG/ML
INJECTION, SOLUTION INTRAMUSCULAR; INTRAVENOUS AS NEEDED
Status: DISCONTINUED | OUTPATIENT
Start: 2023-06-16 | End: 2023-06-16 | Stop reason: SURG

## 2023-06-16 RX ORDER — ONDANSETRON 2 MG/ML
4 INJECTION INTRAMUSCULAR; INTRAVENOUS EVERY 6 HOURS PRN
Status: DISCONTINUED | OUTPATIENT
Start: 2023-06-16 | End: 2023-06-18

## 2023-06-16 RX ORDER — HYDROMORPHONE HCL 110MG/55ML
0.5 PATIENT CONTROLLED ANALGESIA SYRINGE INTRAVENOUS
Status: DISCONTINUED | OUTPATIENT
Start: 2023-06-16 | End: 2023-06-17

## 2023-06-16 RX ORDER — CEFAZOLIN SODIUM 1 G/3ML
INJECTION, POWDER, FOR SOLUTION INTRAMUSCULAR; INTRAVENOUS AS NEEDED
Status: DISCONTINUED | OUTPATIENT
Start: 2023-06-16 | End: 2023-06-16 | Stop reason: SURG

## 2023-06-16 RX ORDER — CEFAZOLIN SODIUM 2 G/100ML
INJECTION, SOLUTION INTRAVENOUS
Status: DISPENSED
Start: 2023-06-16 | End: 2023-06-17

## 2023-06-16 RX ORDER — ONDANSETRON 2 MG/ML
4 INJECTION INTRAMUSCULAR; INTRAVENOUS ONCE AS NEEDED
Status: DISCONTINUED | OUTPATIENT
Start: 2023-06-16 | End: 2023-06-18

## 2023-06-16 RX ORDER — ONDANSETRON 2 MG/ML
INJECTION INTRAMUSCULAR; INTRAVENOUS AS NEEDED
Status: DISCONTINUED | OUTPATIENT
Start: 2023-06-16 | End: 2023-06-16 | Stop reason: SURG

## 2023-06-16 RX ORDER — LIDOCAINE HYDROCHLORIDE 20 MG/ML
INJECTION, SOLUTION EPIDURAL; INFILTRATION; INTRACAUDAL; PERINEURAL AS NEEDED
Status: DISCONTINUED | OUTPATIENT
Start: 2023-06-16 | End: 2023-06-16 | Stop reason: SURG

## 2023-06-16 RX ORDER — DOCUSATE SODIUM 100 MG/1
100 CAPSULE, LIQUID FILLED ORAL 2 TIMES DAILY PRN
Status: DISCONTINUED | OUTPATIENT
Start: 2023-06-16 | End: 2023-06-18

## 2023-06-16 RX ORDER — PROMETHAZINE HYDROCHLORIDE 25 MG/1
25 TABLET ORAL ONCE AS NEEDED
Status: DISCONTINUED | OUTPATIENT
Start: 2023-06-16 | End: 2023-06-18

## 2023-06-16 RX ORDER — OXYCODONE HYDROCHLORIDE 5 MG/1
5 TABLET ORAL EVERY 4 HOURS PRN
Status: DISCONTINUED | OUTPATIENT
Start: 2023-06-16 | End: 2023-06-18

## 2023-06-16 RX ORDER — OXYCODONE HYDROCHLORIDE 5 MG/1
10 TABLET ORAL EVERY 4 HOURS PRN
Status: DISCONTINUED | OUTPATIENT
Start: 2023-06-16 | End: 2023-06-18

## 2023-06-16 RX ORDER — MISOPROSTOL 200 UG/1
TABLET ORAL
Status: DISPENSED
Start: 2023-06-16 | End: 2023-06-17

## 2023-06-16 RX ORDER — METOCLOPRAMIDE HYDROCHLORIDE 5 MG/ML
INJECTION INTRAMUSCULAR; INTRAVENOUS
Status: DISCONTINUED
Start: 2023-06-16 | End: 2023-06-16

## 2023-06-16 RX ORDER — PROPOFOL 10 MG/ML
VIAL (ML) INTRAVENOUS AS NEEDED
Status: DISCONTINUED | OUTPATIENT
Start: 2023-06-16 | End: 2023-06-16 | Stop reason: SURG

## 2023-06-16 RX ORDER — ACETAMINOPHEN 325 MG/1
650 TABLET ORAL EVERY 6 HOURS
Status: DISCONTINUED | OUTPATIENT
Start: 2023-06-17 | End: 2023-06-17

## 2023-06-16 RX ORDER — OXYCODONE HYDROCHLORIDE AND ACETAMINOPHEN 5; 325 MG/1; MG/1
1 TABLET ORAL EVERY 4 HOURS PRN
Status: DISCONTINUED | OUTPATIENT
Start: 2023-06-16 | End: 2023-06-18

## 2023-06-16 RX ORDER — SODIUM CHLORIDE 9 MG/ML
30 INJECTION, SOLUTION INTRAVENOUS CONTINUOUS PRN
Status: DISCONTINUED | OUTPATIENT
Start: 2023-06-16 | End: 2023-06-17

## 2023-06-16 RX ORDER — IBUPROFEN 600 MG/1
600 TABLET ORAL EVERY 6 HOURS
Status: DISCONTINUED | OUTPATIENT
Start: 2023-06-18 | End: 2023-06-18

## 2023-06-16 RX ORDER — PROMETHAZINE HYDROCHLORIDE 25 MG/1
25 SUPPOSITORY RECTAL ONCE AS NEEDED
Status: DISCONTINUED | OUTPATIENT
Start: 2023-06-16 | End: 2023-06-18

## 2023-06-16 RX ORDER — ACETAMINOPHEN 500 MG
1000 TABLET ORAL EVERY 6 HOURS
Status: DISPENSED | OUTPATIENT
Start: 2023-06-16 | End: 2023-06-17

## 2023-06-16 RX ORDER — ALUMINA, MAGNESIA, AND SIMETHICONE 2400; 2400; 240 MG/30ML; MG/30ML; MG/30ML
15 SUSPENSION ORAL EVERY 4 HOURS PRN
Status: DISCONTINUED | OUTPATIENT
Start: 2023-06-16 | End: 2023-06-18

## 2023-06-16 RX ORDER — CARBOPROST TROMETHAMINE 250 UG/ML
250 INJECTION, SOLUTION INTRAMUSCULAR ONCE
Status: DISCONTINUED | OUTPATIENT
Start: 2023-06-16 | End: 2023-06-18

## 2023-06-16 RX ORDER — SODIUM CHLORIDE, SODIUM LACTATE, POTASSIUM CHLORIDE, CALCIUM CHLORIDE 600; 310; 30; 20 MG/100ML; MG/100ML; MG/100ML; MG/100ML
INJECTION, SOLUTION INTRAVENOUS CONTINUOUS PRN
Status: DISCONTINUED | OUTPATIENT
Start: 2023-06-16 | End: 2023-06-16 | Stop reason: SURG

## 2023-06-16 RX ORDER — MISOPROSTOL 200 UG/1
600 TABLET ORAL ONCE
Status: DISCONTINUED | OUTPATIENT
Start: 2023-06-16 | End: 2023-06-18

## 2023-06-16 RX ORDER — ROCURONIUM BROMIDE 10 MG/ML
INJECTION, SOLUTION INTRAVENOUS AS NEEDED
Status: DISCONTINUED | OUTPATIENT
Start: 2023-06-16 | End: 2023-06-16 | Stop reason: SURG

## 2023-06-16 RX ORDER — MEPERIDINE HYDROCHLORIDE 25 MG/ML
12.5 INJECTION INTRAMUSCULAR; INTRAVENOUS; SUBCUTANEOUS
Status: DISCONTINUED | OUTPATIENT
Start: 2023-06-16 | End: 2023-06-17

## 2023-06-16 RX ORDER — KETOROLAC TROMETHAMINE 15 MG/ML
15 INJECTION, SOLUTION INTRAMUSCULAR; INTRAVENOUS EVERY 6 HOURS
Status: COMPLETED | OUTPATIENT
Start: 2023-06-17 | End: 2023-06-17

## 2023-06-16 RX ORDER — NALOXONE HCL 0.4 MG/ML
0.1 VIAL (ML) INJECTION
Status: DISCONTINUED | OUTPATIENT
Start: 2023-06-16 | End: 2023-06-17

## 2023-06-16 RX ORDER — OXYTOCIN 10 [USP'U]/ML
INJECTION, SOLUTION INTRAMUSCULAR; INTRAVENOUS AS NEEDED
Status: DISCONTINUED | OUTPATIENT
Start: 2023-06-16 | End: 2023-06-16 | Stop reason: SURG

## 2023-06-16 RX ORDER — DEXAMETHASONE SODIUM PHOSPHATE 4 MG/ML
INJECTION, SOLUTION INTRA-ARTICULAR; INTRALESIONAL; INTRAMUSCULAR; INTRAVENOUS; SOFT TISSUE AS NEEDED
Status: DISCONTINUED | OUTPATIENT
Start: 2023-06-16 | End: 2023-06-16 | Stop reason: SURG

## 2023-06-16 RX ADMIN — OXYTOCIN 10 UNITS: 10 INJECTION, SOLUTION INTRAMUSCULAR; INTRAVENOUS at 16:04

## 2023-06-16 RX ADMIN — Medication 100 MG: at 15:57

## 2023-06-16 RX ADMIN — PROPOFOL 150 MG: 10 INJECTION, EMULSION INTRAVENOUS at 15:57

## 2023-06-16 RX ADMIN — CEFAZOLIN SODIUM 1 G: 1 INJECTION, POWDER, FOR SOLUTION INTRAMUSCULAR; INTRAVENOUS at 15:57

## 2023-06-16 RX ADMIN — DEXAMETHASONE SODIUM PHOSPHATE 4 MG: 4 INJECTION, SOLUTION INTRA-ARTICULAR; INTRALESIONAL; INTRAMUSCULAR; INTRAVENOUS; SOFT TISSUE at 16:12

## 2023-06-16 RX ADMIN — METOCLOPRAMIDE HYDROCHLORIDE: 5 INJECTION INTRAMUSCULAR; INTRAVENOUS at 15:50

## 2023-06-16 RX ADMIN — FENTANYL CITRATE 50 MCG: 50 INJECTION, SOLUTION INTRAMUSCULAR; INTRAVENOUS at 16:17

## 2023-06-16 RX ADMIN — LIDOCAINE HYDROCHLORIDE 100 MG: 20 INJECTION, SOLUTION EPIDURAL; INFILTRATION; INTRACAUDAL; PERINEURAL at 15:57

## 2023-06-16 RX ADMIN — GLYCOPYRROLATE 0.4 MG: 0.2 INJECTION INTRAMUSCULAR; INTRAVENOUS at 16:42

## 2023-06-16 RX ADMIN — OXYTOCIN 10 UNITS: 10 INJECTION, SOLUTION INTRAMUSCULAR; INTRAVENOUS at 16:10

## 2023-06-16 RX ADMIN — HYDROMORPHONE HYDROCHLORIDE: 10 INJECTION INTRAMUSCULAR; INTRAVENOUS; SUBCUTANEOUS at 19:55

## 2023-06-16 RX ADMIN — ROCURONIUM BROMIDE 20 MG: 10 INJECTION, SOLUTION INTRAVENOUS at 15:58

## 2023-06-16 RX ADMIN — FAMOTIDINE: 10 INJECTION INTRAVENOUS at 15:50

## 2023-06-16 RX ADMIN — KETOROLAC TROMETHAMINE 30 MG: 30 INJECTION, SOLUTION INTRAMUSCULAR; INTRAVENOUS at 16:22

## 2023-06-16 RX ADMIN — SODIUM CHLORIDE, POTASSIUM CHLORIDE, SODIUM LACTATE AND CALCIUM CHLORIDE: 600; 310; 30; 20 INJECTION, SOLUTION INTRAVENOUS at 15:52

## 2023-06-16 RX ADMIN — HYDROMORPHONE HYDROCHLORIDE 0.5 MG: 2 INJECTION INTRAMUSCULAR; INTRAVENOUS; SUBCUTANEOUS at 18:16

## 2023-06-16 RX ADMIN — MEPERIDINE HYDROCHLORIDE 25 MG: 25 INJECTION INTRAMUSCULAR; INTRAVENOUS; SUBCUTANEOUS at 16:43

## 2023-06-16 RX ADMIN — ONDANSETRON 4 MG: 2 INJECTION INTRAMUSCULAR; INTRAVENOUS at 16:11

## 2023-06-16 RX ADMIN — FENTANYL CITRATE 50 MCG: 50 INJECTION, SOLUTION INTRAMUSCULAR; INTRAVENOUS at 16:10

## 2023-06-16 RX ADMIN — SODIUM CHLORIDE, POTASSIUM CHLORIDE, SODIUM LACTATE AND CALCIUM CHLORIDE: 600; 310; 30; 20 INJECTION, SOLUTION INTRAVENOUS at 16:23

## 2023-06-16 RX ADMIN — NEOSTIGMINE METHYLSULFATE 3 MG: 1 INJECTION INTRAVENOUS at 16:42

## 2023-06-16 NOTE — L&D DELIVERY NOTE
SECTION REPEAT  Procedure Report    Patient Name:  Arlet Montoya  YOB: 1975    Date of Surgery:  2023     Indications: 47-year-old  2 para 1-0-0-1 at 40 weeks and 4/7  Previous  section  Uterine surgery for myomectomy  Refusing repeat  section.  Prolonged deceleration for 4 minutes    Pre-op Diagnosis:   40 weeks 4/7  Previous  section  Uterine surgery for myomectomy  Prolonged deceleration for 4 minutes       Post-Op Diagnosis Codes:  Same    Procedure/CPT® Codes:      Procedure(s):   SECTION REPEAT    Staff:    Kavon Saldana MD Surgeon  Assisting Surgeon(s):  Emily Lopez MD         Anesthesia: General  Estimated Blood Loss:  800    Implants:    Nothing was implanted during the procedure    Specimen:          A: Placenta        Findings: Female infant in cephalic presentation, thick meconium.  Female who weighed 7 pounds 13.2 ounces with Apgar 8 and 9    Complications: None    Description of Procedure: Pt was taken to the OR where  general anesthesia was obtained.  Patient was prepped and draped in the normal sterile fashion and time out was performed.      A pfannenstiel skin incision was made with a scalpel and carried down to the underlying layer of fascia.  The fascia was knicked in the midline with a scalpel.  The fascial incision was extended laterally sharply with collier scissors.  The rectus muscles were dissected off superiorly and inferiorly bluntly .  The peritoneum was identified and entered bluntly and stretched laterally bluntly.  The bladder blade was inserted. The uterus was incised with a scalpel in the lower uterine segment.  This incision was extended laterally bluntly.  The baby's head was identified and brought to the incision and delivered atraumatically.  The rest of the body was delivered atraumatically.  The baby was bulb suctioned and the cord was clamped and cut.  Baby was handed off to waiting   team.  The placenta was delivered manually and intact. The uterus was removed from the abdomen and cleared of all clots and debris.  The uterine incision was repaired using 0 vicryl in a locked fashion.  A second imbricating layer of the same suture was performed.  Excellent hemostasis was noted.  The abdomen was cleared of all clots and debris.  Another look was taken at the uterine incision.  Excellent hemostasis was noted.  The uterus was returned to the abdomen and the gutters were cleared of all clots and debris.  Another look was taken at the uterine incision which was hemostatic.  The rectus muscles were hemostatic.  The fascia was closed with 0 looped PDS in a running fashion.  Excellent hemostasis was noted.  The skin was closed with 4-0 monocryl in a subcuticular fashion.  Excellent hemostasis was noted.  Sponge, laps and needle counts were correct x 2.  Pt tolerated procedure well and was awake and stable to recovery.  Pt received 2 grams of kefzol and 500 mg azithromycin was given during  section.           Electronically signed by Kavon Harrison MD, 23, 5:01 PM EDT.

## 2023-06-16 NOTE — NURSING NOTE
This RN at bedside reviewing consents with patient and her spouse.  Patient with previous C/S, declines Repeat C/S, and want TOLAC.  Consent for  signed by patient.  Patient and spouse verbalize understanding of risks of proceeding with TOLAC, to include uterine rupture, hemorrhage, death of fetus, or maternal death.  Patient also signed AMA form, verbalizing her declination of Repeat C/S despite physician recommendation.  General consent for OB treatment signed with patient, patient agrees to proceed with C/S if indicated in emergent situation.  Patient also voiced understanding that she is free to withdraw any part of her consents as she wishes.  Explanations of consent noted with Bette Davenport RN at bedside.

## 2023-06-16 NOTE — ANESTHESIA PREPROCEDURE EVALUATION
Anesthesia Evaluation     Patient summary reviewed and Nursing notes reviewed   no history of anesthetic complications:   NPO Solid Status: > 8 hours  NPO Liquid Status: > 2 hours           Airway   Dental      Pulmonary - negative pulmonary ROS   Cardiovascular - negative cardio ROS  Exercise tolerance: good (4-7 METS)        Neuro/Psych- negative ROS  GI/Hepatic/Renal/Endo    (+) GERD well controlled    Musculoskeletal (-) negative ROS    Abdominal    Substance History - negative use     OB/GYN    (+) Pregnant        Other - negative ROS       ROS/Med Hx Other: PAT Nursing Notes unavailable.     non-reassuring FHT                Anesthesia Plan    ASA 2 - emergent     general     (Patient understands anesthesia not responsible for dental damage.)  intravenous induction     Anesthetic plan, risks, benefits, and alternatives have been provided, discussed and informed consent has been obtained with: patient.  Pre-procedure education provided  Use of blood products discussed with patient  Consented to blood products.      CODE STATUS:

## 2023-06-16 NOTE — NURSING NOTE
"Patient arrived via EMS with complaints of contractions.  40w3d. Patient denies any leaking of fluid. Light vaginal bleeding noted upon assessment. Contractions strong upon palpation.  noted to monitor. SVE 7/100/-2. Patient refused and unable to answer questions at this time due to contraction pain. MD at bedside upon patient arrival. MD discussed reasoning for recommending a  and risks of . Patient refused  multiple times despite risks and possible complications. IV fluids started. Patient refused second IV then complied when education was given about reasoning for second IV. Friend of patient, Elvia, at bedside and verbalized patient's wishes of no vaccinations, delayed cord clamping of 5 minutes or more, and no \"pricks\" to baby including metabolic screening, blood sugar testing, or vaccinations.   "

## 2023-06-16 NOTE — SIGNIFICANT NOTE
Dr. Emily Lopez was responsible for performing the following activities: Retraction, Suction, Irrigation, Suturing and assisting with delivery of Fetus and their skilled assistance was necessary for the success of this case.

## 2023-06-16 NOTE — H&P
CHAPIS Plummer  Obstetric History and Physical    Chief Complaint   Patient presents with   • Laboring       Subjective     HPI:    Patient is a 47 y.o. female  currently at 40w3d, who presents to labor and delivery through EMS . She has been counseled By Dr. Phoebe Moreno based on her ??history of myomectomy and previous cesareans section , vaginal delivery is not recommended. Patient has friend who helping the patient for vaginal delivery.      Her prenatal care Is complicated by  prior   desires  and prior mymectomy.  Her previous obstetric/gynecological history is noted for is remarkable for .    The following portions of the patients history were reviewed and updated as appropriate:   current medications, allergies, past medical history, past surgical history, past family history, past social history and current problem list.     Prenatal Information:  Prenatal Results     Initial Prenatal Labs     Test Value Reference Range Date Time    Hemoglobin  15.5 g/dL 12.0 - 15.9 22 1445    Hematocrit  44.9 % 34.0 - 46.6 22 1445    Platelets  250 10*3/mm3 140 - 450 22 1445    Rubella IgG        Hepatitis B SAg        Hepatitis C Ab        RPR        T. Pallidum Ab         ABO        Rh        Antibody Screen        HIV        Urine Culture        Gonorrhea        Chlamydia        TSH        HgB A1c   5.10 % 4.80 - 5.60 22 1445    Varicella IgG        HgB Electrophoresis         Cystic fibrosis               Fetal testing      Test Value Reference Range Date Time    NIPT        MSAFP        AFP-4              2nd and 3rd Trimester     Test Value Reference Range Date Time    Hemoglobin (repeated)        Hematocrit (repeated)        Platelets   250 10*3/mm3 140 - 450 22 1445    GCT        Antibody Screen (repeated)        GTT Fasting        GTT 1 Hr        GTT 2 Hr        GTT 3 Hr        Group B Strep  Negative  Negative 23 1135          Other testing      Test Value  Reference Range Date Time    Parvo IgG         CMV IgG               Drug Screening     Test Value Reference Range Date Time    Amphetamine Screen  Negative ng/mL Qnghoa=5544 05/12/23 0946    Barbiturate Screen  Negative ng/mL Autcbk=295 05/12/23 0946    Benzodiazepine Screen  Negative ng/mL Ipnicf=504 05/12/23 0946    Methadone Screen  Negative ng/mL Niofhv=738 05/12/23 0946    Phencyclidine Screen  Negative ng/mL Cutoff=25 05/12/23 0946    Opiates Screen  Negative ng/mL Lvmvsa=669 05/12/23 0946    THC Screen  Negative ng/mL Cutoff=50 05/12/23 0946    Cocaine Screen  Negative ng/mL Znxgak=091 05/12/23 0946    Propoxyphene Screen  Negative ng/mL Vemsvc=768 05/12/23 0946    Buprenorphine Screen        Methamphetamine Screen        Oxycodone Screen        Tricyclic Antidepressants Screen              Legend    ^: Historical                      External Prenatal Results     Pregnancy Outside Results - Transcribed From Office Records - See Scanned Records For Details     Test Value Date Time    ABO       Rh       Antibody Screen       Varicella IgG       Rubella       Hgb  15.5 g/dL 11/09/22 1445    Hct  44.9 % 11/09/22 1445    Glucose Fasting GTT       Glucose Tolerance Test 1 hour       Glucose Tolerance Test 3 hour       Gonorrhea (discrete)       Chlamydia (discrete)       RPR       VDRL       Syphilis Antibody       HBsAg       Herpes Simplex Virus PCR       Herpes Simplex VIrus Culture       HIV       Hep C RNA Quant PCR       Hep C Antibody       AFP       Group B Strep  Negative  05/12/23 1135    GBS Susceptibility to Clindamycin       GBS Susceptibility to Erythromycin       Fetal Fibronectin       Genetic Testing, Maternal Blood             Drug Screening     Test Value Date Time    Urine Drug Screen       Amphetamine Screen  Negative ng/mL 05/12/23 0946    Barbiturate Screen  Negative ng/mL 05/12/23 0946    Benzodiazepine Screen  Negative ng/mL 05/12/23 0946    Methadone Screen  Negative ng/mL 05/12/23 0946     Phencyclidine Screen  Negative ng/mL 23 0946    Opiates Screen       THC Screen       Cocaine Screen       Propoxyphene Screen  Negative ng/mL 23 0946    Buprenorphine Screen       Methamphetamine Screen       Oxycodone Screen       Tricyclic Antidepressants Screen             Legend    ^: Historical                         Past OB History:     OB History    Para Term  AB Living   2 1 1 0 0 1   SAB IAB Ectopic Molar Multiple Live Births   0 0 0 0 0 1      # Outcome Date GA Lbr Henry/2nd Weight Sex Delivery Anes PTL Lv   2 Current            1 Term 2019    M CS-LTranv   ROBERTO       Past Medical History: No past medical history on file.   Past Surgical History Past Surgical History:   Procedure Laterality Date   •  SECTION  2019   • UTERINE FIBROID SURGERY        Family History: Family History   Problem Relation Age of Onset   • No Known Problems Mother    • No Known Problems Father    • No Known Problems Sister    • No Known Problems Sister    • No Known Problems Sister    • No Known Problems Brother       Social History:  reports that she has never smoked. She has never been exposed to tobacco smoke. She has never used smokeless tobacco.   reports that she does not currently use alcohol.   reports no history of drug use.        General ROS: Pertinent items are noted in HPI  Home Medications:  prenatal vitamin    Allergies:  Allergies   Allergen Reactions   • Contrast Dye (Echo Or Unknown Ct/Mr) Swelling     Pt has sore throat , swelling and breathing issues    • Latex Swelling   • Codeine Unknown - High Severity   • Quetiapine Unknown - High Severity   • Sulfate Other (See Comments)     Keeps awake       Objective       Vital Signs Range for the last 24 hours  Temperature:     Temp Source:     BP:     Pulse:     Respirations:     SPO2:       Physical Examination:   General appearance - alert, well appearing, and in no distress  Abdomen : Gravid,bedside US : Cephalic   presentation    Presentation:    Cervix:     Dilation:  7   Effacement:     Station:         Fetal Heart Rate Assessment   Method:     Beats/min:     Baseline:  145   Variability:  mod   Accels:  Acc   Decels:     Tracing Category:       Uterine Assessment   Method:   external   Frequency (min):  2-4 min   Ctx Count in 10 min:     Duration:     Intensity:     Bronson Units:       GBS is negative     Assessment & Plan       Normal labor    History of uterine scar due to previous surgery        Assessment:  1.  Intrauterine pregnancy at 40w3d gestation with reactive fetal status.    2.  labor  without ROM  3.  Obstetrical history significant for is remarkable for .  4.  GBS status:   Strep Gp B RONALD   Date Value Ref Range Status   2023 Negative Negative Final     Comment:     Centers for Disease Control and Prevention (CDC) and American Congress  of Obstetricians and Gynecologists (ACOG) guidelines for prevention of   group B streptococcal (GBS) disease specify co-collection of  a vaginal and rectal swab specimen to maximize sensitivity of GBS  detection. Per the CDC and ACOG, swabbing both the lower vagina and  rectum substantially increases the yield of detection compared with  sampling the vagina alone.  Penicillin G, ampicillin, or cefazolin are indicated for intrapartum  prophylaxis of  GBS colonization. Reflex susceptibility  testing should be performed prior to use of clindamycin only on GBS  isolates from penicillin-allergic women who are considered a high risk  for anaphylaxis. Treatment with vancomycin without additional testing  is warranted if resistance to clindamycin is noted.         Plan:  1. Discussed  repeat cesrean section. She refused even it is increased her risk to uteirn rupture, hysterectomy, hemorrahge and fetal death and if fetus survivie thenmay have physical and mental morbidity.    2.  Plan of care has been reviewed with patient and patient agrees.   3.  Risks,  benefits of treatment plan have been discussed.  4.  All questions have been answered.        Electronically signed by Kavon Harrison MD, 06/16/23, 2:34 PM EDT.

## 2023-06-17 LAB
BASOPHILS # BLD AUTO: 0.01 10*3/MM3 (ref 0–0.2)
BASOPHILS NFR BLD AUTO: 0.1 % (ref 0–1.5)
DEPRECATED RDW RBC AUTO: 42.9 FL (ref 37–54)
EOSINOPHIL # BLD AUTO: 0.03 10*3/MM3 (ref 0–0.4)
EOSINOPHIL NFR BLD AUTO: 0.2 % (ref 0.3–6.2)
ERYTHROCYTE [DISTWIDTH] IN BLOOD BY AUTOMATED COUNT: 14.9 % (ref 12.3–15.4)
HCT VFR BLD AUTO: 31.2 % (ref 34–46.6)
HGB BLD-MCNC: 10.1 G/DL (ref 12–15.9)
IMM GRANULOCYTES # BLD AUTO: 0.05 10*3/MM3 (ref 0–0.05)
IMM GRANULOCYTES NFR BLD AUTO: 0.4 % (ref 0–0.5)
LYMPHOCYTES # BLD AUTO: 1.5 10*3/MM3 (ref 0.7–3.1)
LYMPHOCYTES NFR BLD AUTO: 11.8 % (ref 19.6–45.3)
MCH RBC QN AUTO: 25.6 PG (ref 26.6–33)
MCHC RBC AUTO-ENTMCNC: 32.4 G/DL (ref 31.5–35.7)
MCV RBC AUTO: 79 FL (ref 79–97)
MONOCYTES # BLD AUTO: 1.46 10*3/MM3 (ref 0.1–0.9)
MONOCYTES NFR BLD AUTO: 11.5 % (ref 5–12)
NEUTROPHILS NFR BLD AUTO: 76 % (ref 42.7–76)
NEUTROPHILS NFR BLD AUTO: 9.65 10*3/MM3 (ref 1.7–7)
NRBC BLD AUTO-RTO: 0 /100 WBC (ref 0–0.2)
PLATELET # BLD AUTO: 185 10*3/MM3 (ref 140–450)
PMV BLD AUTO: 9.5 FL (ref 6–12)
RBC # BLD AUTO: 3.95 10*6/MM3 (ref 3.77–5.28)
WBC NRBC COR # BLD: 12.7 10*3/MM3 (ref 3.4–10.8)

## 2023-06-17 PROCEDURE — 94799 UNLISTED PULMONARY SVC/PX: CPT

## 2023-06-17 PROCEDURE — 85025 COMPLETE CBC W/AUTO DIFF WBC: CPT | Performed by: OBSTETRICS & GYNECOLOGY

## 2023-06-17 PROCEDURE — 25010000002 KETOROLAC TROMETHAMINE PER 15 MG: Performed by: OBSTETRICS & GYNECOLOGY

## 2023-06-17 RX ORDER — ACETAMINOPHEN 325 MG/1
650 TABLET ORAL EVERY 6 HOURS
Status: DISCONTINUED | OUTPATIENT
Start: 2023-06-18 | End: 2023-06-18

## 2023-06-17 RX ORDER — ECHINACEA PURPUREA EXTRACT 125 MG
1 TABLET ORAL AS NEEDED
Status: DISCONTINUED | OUTPATIENT
Start: 2023-06-17 | End: 2023-06-18

## 2023-06-17 RX ADMIN — KETOROLAC TROMETHAMINE 15 MG: 15 INJECTION, SOLUTION INTRAMUSCULAR; INTRAVENOUS at 00:39

## 2023-06-17 RX ADMIN — ACETAMINOPHEN 1000 MG: 500 TABLET ORAL at 06:12

## 2023-06-17 RX ADMIN — ALUMINUM HYDROXIDE, MAGNESIUM HYDROXIDE, AND DIMETHICONE 15 ML: 400; 400; 40 SUSPENSION ORAL at 12:28

## 2023-06-17 RX ADMIN — ACETAMINOPHEN 1000 MG: 500 TABLET ORAL at 09:59

## 2023-06-17 RX ADMIN — SALINE NASAL SPRAY 1 SPRAY: 1.5 SOLUTION NASAL at 17:57

## 2023-06-17 RX ADMIN — CALCIUM CARBONATE 1 TABLET: 500 TABLET, CHEWABLE ORAL at 06:12

## 2023-06-17 RX ADMIN — KETOROLAC TROMETHAMINE 15 MG: 15 INJECTION, SOLUTION INTRAMUSCULAR; INTRAVENOUS at 12:29

## 2023-06-17 RX ADMIN — ACETAMINOPHEN 1000 MG: 500 TABLET ORAL at 20:48

## 2023-06-17 RX ADMIN — KETOROLAC TROMETHAMINE 15 MG: 15 INJECTION, SOLUTION INTRAMUSCULAR; INTRAVENOUS at 06:12

## 2023-06-17 RX ADMIN — IBUPROFEN 600 MG: 600 TABLET ORAL at 23:13

## 2023-06-17 RX ADMIN — KETOROLAC TROMETHAMINE 15 MG: 15 INJECTION, SOLUTION INTRAMUSCULAR; INTRAVENOUS at 17:49

## 2023-06-17 NOTE — NURSING NOTE
This RN has provided much of patient's care today.  Patient continues to need assistance getting in and out of bed, ambulating to chair, and ambulating in room.  Patient's activity has gradually progressed today and patient seems to be tolerating activity better at the end of the shift.  Patient educated on importance of progressing activity and becoming more independent and providing more self care, patient verbalizes understanding and appears to be making more of an effort at the end of this shift.  Patient given nasal spray for c/o sinus congestion, instructed on importance of coughing and deep breathing, instructed on proper use and frequency of IS, instructed on splitting of incision when moving, coughing, or use of IS to decrease pain in incision area. Patient encouraged to use SCD's when in bed to help prevent blood clots Patient has declined shower and removal of dressing at this time.  Patient instructed on the use of Tylenol and Toradol/Ibuprofen to help keep pain level at acceptable level and instructed of PRN Oxycodone for breakthrough pain.  Patient has been given periods of rest this afternoon and evening, instructed on importance of resting when infant is resting for optimal level of activity and infant care.  Patient given opportunities to ask questions and they are answered to her satisfaction.  Will continue with POC.

## 2023-06-17 NOTE — NURSING NOTE
This RN educated patient on importance of pain control, plan of care, and treatment options. Patient declines pain medication or use of PCA pump. Reeducated patient on IS and importance of ambulation. At time of this note, patient is still a 2 person assist. Patient has struggled to care for infant as far as diaper changes. Educated and reeducated multiple times on safe sleeping, infant care, breast feeding.

## 2023-06-17 NOTE — PLAN OF CARE
Problem: Adult Inpatient Plan of Care  Goal: Plan of Care Review  6/17/2023 0641 by Tiffany Robison RN  Outcome: Ongoing, Progressing  6/17/2023 0641 by Tiffany Robison RN  Outcome: Ongoing, Progressing  Goal: Patient-Specific Goal (Individualized)  6/17/2023 0641 by Tiffany Robison RN  Outcome: Ongoing, Progressing  6/17/2023 0641 by Tiffany Robison RN  Outcome: Ongoing, Progressing  Goal: Absence of Hospital-Acquired Illness or Injury  6/17/2023 0641 by Tiffany Robison RN  Outcome: Ongoing, Progressing  6/17/2023 0641 by Tiffany Robison RN  Outcome: Ongoing, Progressing  Intervention: Identify and Manage Fall Risk  Recent Flowsheet Documentation  Taken 6/17/2023 0400 by Tiffany Robison RN  Safety Promotion/Fall Prevention: safety round/check completed  Taken 6/17/2023 0200 by Tiffany Robison RN  Safety Promotion/Fall Prevention: safety round/check completed  Taken 6/17/2023 0000 by Tiffany Robison RN  Safety Promotion/Fall Prevention: safety round/check completed  Taken 6/16/2023 2200 by Tiffany Robison RN  Safety Promotion/Fall Prevention: safety round/check completed  Taken 6/16/2023 2000 by Tiffany Robison RN  Safety Promotion/Fall Prevention: safety round/check completed  Intervention: Prevent Skin Injury  Recent Flowsheet Documentation  Taken 6/16/2023 2310 by Tiffany Robison RN  Body Position:   legs elevated   position changed independently   supine  Intervention: Prevent and Manage VTE (Venous Thromboembolism) Risk  Recent Flowsheet Documentation  Taken 6/16/2023 2310 by Tiffany Robison RN  Activity Management:   ambulated to bathroom   back to bed   standing at bedside   stepped/marched in place   activity encouraged  VTE Prevention/Management:   bilateral   sequential compression devices on  Taken 6/16/2023 2000 by Tiffany Robison RN  Activity Management: bedrest  Goal: Optimal Comfort and Wellbeing  6/17/2023 0641 by Tiffany Robison RN  Outcome: Ongoing, Progressing  6/17/2023 0641 by  Tiffany Robison RN  Outcome: Ongoing, Progressing  Intervention: Provide Person-Centered Care  Recent Flowsheet Documentation  Taken 6/16/2023 2310 by Tiffany Robison RN  Trust Relationship/Rapport:   care explained   choices provided   emotional support provided   empathic listening provided   questions answered   questions encouraged   reassurance provided   thoughts/feelings acknowledged  Taken 6/16/2023 2000 by Tiffany Robison RN  Trust Relationship/Rapport:   care explained   emotional support provided   choices provided   empathic listening provided   questions encouraged   questions answered   reassurance provided   thoughts/feelings acknowledged  Goal: Readiness for Transition of Care  6/17/2023 0641 by Tiffany Robison RN  Outcome: Ongoing, Progressing  6/17/2023 0641 by Tiffany Robison RN  Outcome: Ongoing, Progressing  Intervention: Mutually Develop Transition Plan  Recent Flowsheet Documentation  Taken 6/16/2023 2238 by Tiffany Robison RN  Equipment Currently Used at Home: none  Taken 6/16/2023 2235 by Tiffany Robison RN  Transportation Anticipated: family or friend will provide  Patient/Family Anticipated Services at Transition: none  Patient/Family Anticipates Transition to: home with family   Goal Outcome Evaluation:

## 2023-06-17 NOTE — LACTATION NOTE
Initial visit with dyad, baby had just finished feeding on left breast for 30 min, pt report baby not latching well on right side, baby seems satisfied at this time and pt expressed being very tired, offered to put baby in crib so pt could try and nap, at first was reluctant but then did let LC swaddle baby and place in crib. Pt did ask about baby not having a void yet, explained that baby had 24 hours to have first wet diaper, she then ask if baby could have formula, told pt that is she wanted to supplement that she could use formula if she wanted, she expressed wanting to use formula from home as well as diapers from home. When ask if the formula was a specail brand she stated Similac, told her we have that brand here, that she didn't have to bring her's from home and she replied, she would rather use hers from home than the hospitals. Discussed attempting to breastfeed baby every 2-3 hours, allowing unlimited access to breast with unlimited time feeding. Encouraged to do awake, skin to skin as much as possible. Discussed what to expect over the next few days as breastfeeding is established. LC encouraged pt to call for assistance as needed.

## 2023-06-17 NOTE — PLAN OF CARE
Problem: Adult Inpatient Plan of Care  Goal: Plan of Care Review  Outcome: Ongoing, Progressing  Goal: Patient-Specific Goal (Individualized)  Outcome: Ongoing, Progressing  Goal: Absence of Hospital-Acquired Illness or Injury  Outcome: Ongoing, Progressing  Intervention: Identify and Manage Fall Risk  Recent Flowsheet Documentation  Taken 6/17/2023 1800 by Brunilda Samayoa RN  Safety Promotion/Fall Prevention: safety round/check completed  Taken 6/17/2023 1700 by Brunilda Samayoa RN  Safety Promotion/Fall Prevention: safety round/check completed  Taken 6/17/2023 1600 by Brunilda Samayoa RN  Safety Promotion/Fall Prevention: safety round/check completed  Taken 6/17/2023 1500 by Brunilda Samayoa RN  Safety Promotion/Fall Prevention: safety round/check completed  Taken 6/17/2023 1325 by Brunilda Samayoa RN  Safety Promotion/Fall Prevention: safety round/check completed  Taken 6/17/2023 1229 by Brunilda Samayoa RN  Safety Promotion/Fall Prevention: safety round/check completed  Taken 6/17/2023 1100 by Brunilda Samayoa RN  Safety Promotion/Fall Prevention: safety round/check completed  Taken 6/17/2023 1030 by Brunilda Samayoa RN  Safety Promotion/Fall Prevention: safety round/check completed  Taken 6/17/2023 0830 by Brunilda Samayoa RN  Safety Promotion/Fall Prevention: safety round/check completed  Taken 6/17/2023 0720 by Brunilda Samayoa RN  Safety Promotion/Fall Prevention: safety round/check completed  Intervention: Prevent and Manage VTE (Venous Thromboembolism) Risk  Recent Flowsheet Documentation  Taken 6/17/2023 1325 by Brunilda Samayoa RN  Activity Management:   ambulated to bathroom   back to bed  Taken 6/17/2023 1030 by Brunilda Samayoa RN  Activity Management:   activity encouraged   ambulated to bathroom   ambulated in room  VTE Prevention/Management:   bilateral   sequential compression devices on  Goal: Optimal Comfort and Wellbeing  Outcome: Ongoing, Progressing  Intervention: Monitor Pain and Promote  Comfort  Recent Flowsheet Documentation  Taken 6/17/2023 1229 by Brunilda Samayoa RN  Pain Management Interventions:   see MAR   quiet environment facilitated  Taken 6/17/2023 1000 by Brunilda Samayoa RN  Pain Management Interventions:   pain management plan reviewed with patient/caregiver   see MAR   quiet environment facilitated   relaxation techniques promoted   pillow support provided  Intervention: Provide Person-Centered Care  Recent Flowsheet Documentation  Taken 6/17/2023 1030 by Brunilda Samayoa RN  Trust Relationship/Rapport:   care explained   choices provided   emotional support provided   empathic listening provided   questions answered   questions encouraged   reassurance provided   thoughts/feelings acknowledged  Goal: Readiness for Transition of Care  Outcome: Ongoing, Progressing     Problem: Adult Inpatient Plan of Care  Goal: Absence of Hospital-Acquired Illness or Injury  Intervention: Prevent and Manage VTE (Venous Thromboembolism) Risk  Recent Flowsheet Documentation  Taken 6/17/2023 1325 by Brunilda Samayoa RN  Activity Management:   ambulated to bathroom   back to bed  Taken 6/17/2023 1030 by Brunilda Samayoa RN  Activity Management:   activity encouraged   ambulated to bathroom   ambulated in room  VTE Prevention/Management:   bilateral   sequential compression devices on     Problem: Adult Inpatient Plan of Care  Goal: Optimal Comfort and Wellbeing  Outcome: Ongoing, Progressing  Intervention: Monitor Pain and Promote Comfort  Recent Flowsheet Documentation  Taken 6/17/2023 1229 by Brunilda Samayoa RN  Pain Management Interventions:   see MAR   quiet environment facilitated  Taken 6/17/2023 1000 by Brunilda Samayoa RN  Pain Management Interventions:   pain management plan reviewed with patient/caregiver   see MAR   quiet environment facilitated   relaxation techniques promoted   pillow support provided  Intervention: Provide Person-Centered Care  Recent Flowsheet Documentation  Taken  2023 1030 by Brunilda Samayoa, RN  Trust Relationship/Rapport:   care explained   choices provided   emotional support provided   empathic listening provided   questions answered   questions encouraged   reassurance provided   thoughts/feelings acknowledged     Problem: Breastfeeding  Goal: Effective Breastfeeding  Outcome: Ongoing, Progressing     Problem: Adjustment to Role Transition (Postpartum  Delivery)  Goal: Successful Maternal Role Transition  Outcome: Ongoing, Progressing     Problem: Bleeding (Postpartum  Delivery)  Goal: Hemostasis  Outcome: Ongoing, Progressing     Problem: Infection (Postpartum  Delivery)  Goal: Absence of Infection Signs and Symptoms  Outcome: Ongoing, Progressing     Problem: Pain (Postpartum  Delivery)  Goal: Acceptable Pain Control  Outcome: Ongoing, Progressing  Intervention: Prevent or Manage Pain  Recent Flowsheet Documentation  Taken 2023 1229 by Brunilda Samayoa, RN  Pain Management Interventions:   see MAR   quiet environment facilitated  Taken 2023 1000 by Brunilda Samayoa, RN  Pain Management Interventions:   pain management plan reviewed with patient/caregiver   see MAR   quiet environment facilitated   relaxation techniques promoted   pillow support provided     Problem: Postoperative Nausea and Vomiting (Postpartum  Delivery)  Goal: Nausea and Vomiting Relief  Outcome: Ongoing, Progressing     Problem: Postoperative Urinary Retention (Postpartum  Delivery)  Goal: Effective Urinary Elimination  Outcome: Ongoing, Progressing   Goal Outcome Evaluation:

## 2023-06-17 NOTE — PROGRESS NOTES
[place]  [facility] Delivery Progress Note    Subjective   Postpartum Day 1: [facility] Delivery    The patient feels well.  Her pain is well controlled.   She is ambulating well.  Patient describes her bleeding as moderate lochia.  Patient is tolerating regular diet and urinating without difficulty.  Pt c/o stuffy nose and sore throat    Breastfeeding: without difficulty.    Objective     Vital Signs Range for the last 24 hours  Temperature: Temp:  [97 °F (36.1 °C)-97.5 °F (36.4 °C)] 97.5 °F (36.4 °C)   Temp Source: Temp src: Oral   BP: BP: ()/(52-80) 119/67   Pulse: Heart Rate:  [65-84] 72   Respirations: Resp:  [14-26] 14     Physical Exam:  General:  no acute distress.  Abdomen: abdomen is soft without significant tenderness, masses, organomegaly or guarding.   Fundus: appropriate, firm, non tender, moderate lochia  Dressing: clean, dry and intact   Extremities: normal, atraumatic, no cyanosis, and trace edema.     Rubella: No results found for: RUBELLAABIGG  Rh Status:    RH type   Date Value Ref Range Status   2023 Positive  Final     Immunizations: There is no immunization history for the selected administration types on file for this patient.    Assessment & Plan        delivery delivered    Normal labor    History of uterine scar due to previous surgery    Delivery by emergency  section      [place] is Day 1  post-partum  , Low Transverse   .      Plan:  Continue current care.  Will get saline spray;  Declines chloroseptic;  Will d/c dilaudid, SLIV;  Encouraged pt to move around today after she gets some sleep      Electronically signed by Phoebe Moreno MD, 23, 8:14 AM EDT.

## 2023-06-17 NOTE — LACTATION NOTE
Pt latched baby to right breast in football position, some soreness with initial latch, lower lip adjusted and better. Pt and LC had discussion regarding goats milk liquid vs powdered formula. She stated once she is home she will do what she wants.

## 2023-06-18 VITALS
SYSTOLIC BLOOD PRESSURE: 98 MMHG | HEIGHT: 64 IN | BODY MASS INDEX: 36.02 KG/M2 | HEART RATE: 92 BPM | DIASTOLIC BLOOD PRESSURE: 66 MMHG | WEIGHT: 211 LBS | RESPIRATION RATE: 16 BRPM | TEMPERATURE: 98 F | OXYGEN SATURATION: 9 %

## 2023-06-18 PROCEDURE — 88307 TISSUE EXAM BY PATHOLOGIST: CPT

## 2023-06-18 RX ADMIN — ACETAMINOPHEN 650 MG: 325 TABLET ORAL at 04:28

## 2023-06-18 RX ADMIN — IBUPROFEN 600 MG: 600 TABLET ORAL at 07:57

## 2023-06-18 RX ADMIN — ACETAMINOPHEN 650 MG: 325 TABLET ORAL at 11:06

## 2023-06-18 NOTE — NURSING NOTE
"Patient called out with complaint of waiting 1.5 hours for help. Patient had called at 2204. Primary RN was in patients room performing CCHD at 2100. Upon arrival to the room primary rn asked what was needed. The patient wanted her light turned out and was expressive on how upset she was with the lack of response from staff. Primary RN and PCA were both with other patients. Primary RN asked if there was a reason the patient could not turn her own light off. Patient then started to get argumentative with RN with Vicky STEWART RN at bedside. RN reminded patient that she is independent in ambulation and care and asked why the patient was unable to turn the light off. The patient states she wont get out of bed with infant in arm. Primary RN stated she had options to ambulate with infant or place infant in the crib. RN educated on promotion of independence and how it is beneficial to recovery. Patient continues to refuse to perform basic care needs and requests staff to perform. RN asked her plan of assistance for infant care at home. Patient state the RN had no right to ask about her home life. Primary RN educated patient on assessment by RN on ability to care for infant and self. Patient states \"I don't understand why you cannot come and turn my light off\". Primary RN apologized that she was caring for other patients and could not leave the room at that time. Patient then states that \"that does not matter\".  Patient states she feels disrespected due to lack of response from staff and has requested to be discharged. Primary RN acknowledged and validated patients feelings. Charge RN, , and Hospitalist and Neonatologist made aware of patients request for discharge.  "

## 2023-06-18 NOTE — DISCHARGE INSTR - ACTIVITY
No driving for 2 weeks.  Incision care daily  1 time up and down stairs daily.  No sex for 5-6 weeks until  gives permission.  No heavier lifting than baby in her car seat.

## 2023-06-18 NOTE — PROGRESS NOTES
Postoperative day #2 status post low transverse  section after failed trial of labor.  Patient is doing well.  She is ambulating without difficulty she is urinating without difficulty she is passing gas she is tolerating a regular diet.  Postoperative hemoglobin hematocrit 10 and 31  On physical exam the patient is alert and oriented with stable vital signs  Lungs are clear to auscultation bilaterally  Neck exam reveals a regular rate and rhythm  Abdomen is soft and nontender slightly distended.  Hypoactive bowel sounds.  The uterus is palpable below the umbilicus it is also nontender.  Vision is clean dry and intact without signs or symptoms of infection  Extremity exam is unremarkable.  Trace pedal edema no cyanosis no calf tenderness  Postoperative day #2.  Stable postoperative course.  The patient is requesting discharge to home.  She is discharged in stable condition.  She will follow-up with her private obstetrician Dr. Marmolejo.  I suggested she call him tomorrow and follow-up within 7 days.  I reviewed activity limitations the patient evidences understanding.  She does not require any narcotic medication and declines.  She will use Motrin and Tylenol as needed for pain.  Condition on discharge is stable the patient will arrange follow-up activity limitations reviewed the patient will be discharged on a regular diet

## 2023-06-18 NOTE — NURSING NOTE
Assumed care of patient from Brunilda RN at 1900. Patient had refused CCHD for infant previously but consented at a later time. Introduced to patient while infant was nursing and informed her to call out when infant was done nursing. Patient verbalized understanding and agreed. Patient called out for help, Primary RN at bedside, asked to remove IV and perform CCHD on infant. Patient refused at this time.

## 2023-06-18 NOTE — NURSING NOTE
Patient called for assistance due to her baby being fussy and wanting a staff member to calm the infant down. Patient has been educated on importance of being independent for the benefits of her recovery. Patient not receptive to education.

## 2023-06-18 NOTE — NURSING NOTE
Patient just called out using call button to ask for the nurse to come reposition her own foot and turn her lights on. Patient is ambulatory with no assistance but is continuing to refuse to perform basic tasks for herself.

## 2023-06-18 NOTE — DISCHARGE INSTR - LAB
May take Tylenol and Motrin every 6 hours for pain. To get over the counter. May take up to 600 mg (3 tablets) of motrin every 6 hours and Tylenol 650 mg every 6 hours.

## 2023-06-18 NOTE — NURSING NOTE
Upon arrival into the room, patient has incentive spirometer thrown in the trash. Patient also refuses to shower and removed incision dressing despite education on infection control. Patient's call bell within reach.

## 2023-06-18 NOTE — LACTATION NOTE
Pt states baby is latching ok, still not latching much on the right side. Discussed different positioning. Pt given pump for home use. Pt concerned that baby wasn't getting enough at the breast, LC easily hand expressed colostrum from both breasts. D/C instructions gone over, included hand hygiene, respiratory hygiene and breastfeeding when mom is sick, LC encouraged pt to see pediatrician within two days of discharge for follow up.  LC discussed  breastfeeding behaviors, first two weeks of breastfeeding expectations, encouraged her to breastfeed/pump frequently for good milk supply. LC discussed nipple care, plugged ducts, engorgement, and breast infection. LC informed pt that LC was available after D/C for assistance with breastfeeding.

## 2023-06-18 NOTE — ANESTHESIA POSTPROCEDURE EVALUATION
Patient: Arlet Montoya    Procedure Summary       Date: 23 Room / Location: [facility] LABOR DELIVERY  [facility] LABOR DELIVERY    Anesthesia Start: 1553 Anesthesia Stop:     Procedure:  SECTION REPEAT (Abdomen) Diagnosis:     Surgeons: Emily Lopez MD Provider: Reyes, Mirabelle, DO    Anesthesia Type: general ASA Status: 2 - Emergent            Anesthesia Type: general    Vitals  Vitals Value Taken Time   BP 98/66 23 0900   Temp 36.7 °C (98 °F) 23 0900   Pulse 92 23 0900   Resp 16 23 0900   SpO2 9 % 23 1800           Post Anesthesia Care and Evaluation    Patient location during evaluation: bedside  Patient participation: complete - patient participated  Level of consciousness: awake  Pain management: adequate    Airway patency: patent  PONV Status: none  Cardiovascular status: acceptable and stable  Respiratory status: acceptable  Hydration status: acceptable    Comments: An Anesthesiologist personally participated in the most demanding procedures (including induction and emergence if applicable) in the anesthesia plan, monitored the course of anesthesia administration at frequent intervals and remained physically present and available for immediate diagnosis and treatment of emergencies.

## 2023-06-21 LAB
CYTO UR: NORMAL
LAB AP CASE REPORT: NORMAL
LAB AP CLINICAL INFORMATION: NORMAL
PATH REPORT.FINAL DX SPEC: NORMAL
PATH REPORT.GROSS SPEC: NORMAL

## 2024-02-29 ENCOUNTER — HOSPITAL ENCOUNTER (EMERGENCY)
Facility: HOSPITAL | Age: 49
Discharge: HOME | End: 2024-02-29
Attending: EMERGENCY MEDICINE
Payer: OTHER GOVERNMENT

## 2024-02-29 VITALS
TEMPERATURE: 98.7 F | DIASTOLIC BLOOD PRESSURE: 71 MMHG | RESPIRATION RATE: 18 BRPM | HEART RATE: 68 BPM | OXYGEN SATURATION: 98 % | SYSTOLIC BLOOD PRESSURE: 107 MMHG

## 2024-02-29 DIAGNOSIS — J06.9 UPPER RESPIRATORY TRACT INFECTION, UNSPECIFIED TYPE: Primary | ICD-10-CM

## 2024-02-29 PROCEDURE — 99283 EMERGENCY DEPT VISIT LOW MDM: CPT | Performed by: EMERGENCY MEDICINE

## 2024-02-29 PROCEDURE — 99282 EMERGENCY DEPT VISIT SF MDM: CPT

## 2024-02-29 RX ORDER — DEXTROMETHORPHAN HYDROBROMIDE AND PROMETHAZINE HYDROCHLORIDE 15; 6.25 MG/5ML; MG/5ML
5 SYRUP ORAL 4 TIMES DAILY PRN
Qty: 120 ML | Refills: 0 | Status: SHIPPED | OUTPATIENT
Start: 2024-02-29 | End: 2024-03-06

## 2024-02-29 RX ORDER — DEXTROMETHORPHAN HYDROBROMIDE AND PROMETHAZINE HYDROCHLORIDE 15; 6.25 MG/5ML; MG/5ML
5 SYRUP ORAL 4 TIMES DAILY PRN
Qty: 120 ML | Refills: 0 | Status: SHIPPED | OUTPATIENT
Start: 2024-02-29 | End: 2024-02-29 | Stop reason: SDUPTHER

## 2024-02-29 NOTE — FSED PROVIDER NOTE
Subjective   History of Present Illness  Patient is a 48-year-old female.  She presents with a 1 week history of nasal congestion, cough, subjective fever.  Both children are ill with similar symptoms.      Review of Systems  Constitutional: No fevers, chills, sweats unless otherwise documented in HPI  Eyes: No recent visual problems, eye discharge, eye pain, redness unless otherwise documented in HPI  HEENT: No ear pain, nasal congestion, sore throat, voice changes unless otherwise documented in HPI  Respiratory: No shortness of breath, cough, pain on breathing, sputum production unless otherwise documented in HPI  Cardiovascular: No chest pain, palpitations, syncope, orthopnea unless otherwise documented in HPI  Gastrointestinal: No nausea, vomiting, diarrhea, constipation unless otherwise documented in HPI  Genitourinary: No hematuria, dysuria, incontinence unless otherwise documented in HPI  Endocrine: Negative for excessive thirst, excessive hunger, excessive urination, heat or cold intolerance unless otherwise documented in HPI  Musculoskeletal: No back pain, neck pain, joint pain, muscle pain, decreased range of motion unless otherwise documented in HPI  Integumentary: No rash, pruritus, abrasion, lesions unless otherwise documented in HPI  Neurologic: No weakness, numbness, frequent headaches, tremors unless otherwise documented in HPI  Psychiatric: No anxiety, depression, mood changes, hallucinations unless otherwise documented in HPI        History reviewed. No pertinent past medical history.    Allergies   Allergen Reactions    Contrast Dye (Echo Or Unknown Ct/Mr) Swelling     Pt has sore throat , swelling and breathing issues     Latex Swelling    Codeine Unknown - High Severity    Quetiapine Unknown - High Severity    Sulfate Other (See Comments)     Keeps awake       Past Surgical History:   Procedure Laterality Date     SECTION  2019     SECTION N/A 2023    Procedure:   SECTION REPEAT;  Surgeon: Emily Lopez MD;  Location: [facility] LABOR DELIVERY;  Service: Gynecology;  Laterality: N/A;    UTERINE FIBROID SURGERY         Family History   Problem Relation Age of Onset    No Known Problems Mother     No Known Problems Father     No Known Problems Sister     No Known Problems Sister     No Known Problems Sister     No Known Problems Brother        Social History     Socioeconomic History    Marital status:    Tobacco Use    Smoking status: Never     Passive exposure: Never    Smokeless tobacco: Never   Vaping Use    Vaping Use: Never used   Substance and Sexual Activity    Alcohol use: Not Currently     Comment: socially    Drug use: Never    Sexual activity: Not Currently     Partners: Male           Objective   Physical Exam  Vital signs: Reviewed in nurses notes    General: Awake alert no acute distress    HEENT: Normocephalic atraumatic oropharynx is moist, mildly erythematous without exudate    Neck:   Supple without meningismus    Respiratory:   Clear to auscultation bilaterally with equal breath sounds    Cardiovascular: Regular rate and rhythm    Abdomen: Nondistended    Skin:   Warm and dry.  No rashes noted    Neurological examination: Patient is awake alert oriented x4.  Speech is normal.  No facial palsy.  No focal motor or sensory deficits.    Procedures           ED Course      Patient declines formal PCR testing                                     Medical Decision Making  Problems Addressed:  Upper respiratory tract infection, unspecified type: complicated acute illness or injury    Risk  Prescription drug management.        Final diagnoses:   Upper respiratory tract infection, unspecified type       ED Disposition  ED Disposition       ED Disposition   Discharge    Condition   Stable    Comment   --               No follow-up provider specified.       Medication List        New Prescriptions      promethazine-dextromethorphan 6.25-15 MG/5ML  syrup  Commonly known as: PROMETHAZINE-DM  Take 5 mL by mouth 4 (Four) Times a Day As Needed for Cough for up to 6 days.               Where to Get Your Medications        These medications were sent to [id number] - [place], [place] - [address] AT [address] [address] - [phone]  - [phone]   [address], [facility] [address]      Phone: [phone]   promethazine-dextromethorphan 6.25-15 MG/5ML syrup

## 2024-02-29 NOTE — DISCHARGE INSTRUCTIONS
Today your lungs are clear to auscultation.  You do have upper respiratory congestion.  I did send in an appropriate cough and decongestant medication.  Take as directed    Return for increased shortness of breath or other difficulties    Please read all of the instructions in this handout.  If you receive prescriptions please fill them and take them as directed.  Please call your primary care physician for follow-up appointment in the next 5 to 7 days.  If you do not have a physician you may call the [facility] referral line at [phone].    You may return to the emergency department at any time for any concerns such as worsening symptoms.  If you received a work or school note it will be printed at the back of this packet.

## (undated) DEVICE — TRY CATH FOL ADVANCE SIL W/BAG 16F

## (undated) DEVICE — GLV SURG BIOGEL LTX PF 7

## (undated) DEVICE — VIOLET BRAIDED (POLYGLACTIN 910), SYNTHETIC ABSORBABLE SUTURE: Brand: COATED VICRYL

## (undated) DEVICE — CVR HNDL LT SURG ACCSSRY BLU STRL

## (undated) DEVICE — PAD GRND REM POLYHESIVE A/ DISP

## (undated) DEVICE — Device: Brand: PORTEX

## (undated) DEVICE — SUTURE GUT CHROMIC 3/0 912H

## (undated) DEVICE — INTENDED FOR TISSUE SEPARATION, AND OTHER PROCEDURES THAT REQUIRE A SHARP SURGICAL BLADE TO PUNCTURE OR CUT.: Brand: BARD-PARKER ® CARBON RIB-BACK BLADES

## (undated) DEVICE — 3M™ STERI-STRIP™ BLEND TONE SKIN CLOSURES, B1557, TAN, 1/2 IN X 4 IN (12MM X 100MM), 6 STRIPS/ENVELOPE: Brand: 3M™ STERI-STRIP™

## (undated) DEVICE — SUT VICRYL 3/0 J663H

## (undated) DEVICE — DEV TRANSF BLD W/LUER ADPT CA/198

## (undated) DEVICE — ANTIBACTERIAL VIOLET BRAIDED (POLYGLACTIN 910), SYNTHETIC ABSORBABLE SUTURE: Brand: COATED VICRYL

## (undated) DEVICE — NEEDLE,18GX1.5",REG,BEVEL: Brand: MEDLINE

## (undated) DEVICE — C SECTION PACK: Brand: MEDLINE INDUSTRIES, INC.